# Patient Record
Sex: FEMALE | Race: WHITE | Employment: FULL TIME | URBAN - METROPOLITAN AREA
[De-identification: names, ages, dates, MRNs, and addresses within clinical notes are randomized per-mention and may not be internally consistent; named-entity substitution may affect disease eponyms.]

---

## 2018-05-02 ENCOUNTER — HOSPITAL ENCOUNTER (EMERGENCY)
Age: 28
Discharge: HOME | End: 2018-05-02
Payer: COMMERCIAL

## 2018-05-02 VITALS
RESPIRATION RATE: 14 BRPM | TEMPERATURE: 98.2 F | OXYGEN SATURATION: 97 % | WEIGHT: 145 LBS | DIASTOLIC BLOOD PRESSURE: 74 MMHG | SYSTOLIC BLOOD PRESSURE: 114 MMHG | HEART RATE: 86 BPM | BODY MASS INDEX: 25.69 KG/M2

## 2018-05-02 DIAGNOSIS — H10.9 CONJUNCTIVITIS OF BOTH EYES, UNSPECIFIED CONJUNCTIVITIS TYPE: Primary | ICD-10-CM

## 2018-05-02 PROCEDURE — 99212 OFFICE O/P EST SF 10 MIN: CPT

## 2018-05-02 RX ORDER — CIPROFLOXACIN HYDROCHLORIDE 3.5 MG/ML
SOLUTION/ DROPS TOPICAL
Qty: 1 BOTTLE | Refills: 0 | Status: SHIPPED | OUTPATIENT
Start: 2018-05-02

## 2018-05-02 RX ORDER — LORATADINE 10 MG/1
10 TABLET ORAL DAILY
Qty: 30 TABLET | Refills: 0 | Status: SHIPPED | OUTPATIENT
Start: 2018-05-02

## 2018-05-02 ASSESSMENT — PAIN DESCRIPTION - DESCRIPTORS: DESCRIPTORS: ACHING

## 2018-05-02 ASSESSMENT — PAIN SCALES - GENERAL: PAINLEVEL_OUTOF10: 4

## 2018-05-02 ASSESSMENT — PAIN DESCRIPTION - ORIENTATION: ORIENTATION: LEFT;RIGHT

## 2018-05-02 ASSESSMENT — PAIN DESCRIPTION - LOCATION: LOCATION: EYE

## 2018-05-02 ASSESSMENT — PAIN DESCRIPTION - PAIN TYPE: TYPE: ACUTE PAIN

## 2023-05-29 LAB
NIL(NEG) CONTROL SPOT COUNT: NORMAL
PANEL A SPOT COUNT: 1
PANEL B SPOT COUNT: 0
POS CONTROL SPOT COUNT: NORMAL
T-SPOT. TB INTERPRETATION: NEGATIVE

## 2023-06-23 LAB
BASOPHILS (10*3/UL) IN BLOOD BY AUTOMATED COUNT: 0.08 X10E9/L (ref 0–0.1)
BASOPHILS/100 LEUKOCYTES IN BLOOD BY AUTOMATED COUNT: 0.9 % (ref 0–2)
EOSINOPHILS (10*3/UL) IN BLOOD BY AUTOMATED COUNT: 0.6 X10E9/L (ref 0–0.7)
EOSINOPHILS/100 LEUKOCYTES IN BLOOD BY AUTOMATED COUNT: 6.7 % (ref 0–6)
ERYTHROCYTE DISTRIBUTION WIDTH (RATIO) BY AUTOMATED COUNT: 12.4 % (ref 11.5–14.5)
ERYTHROCYTE MEAN CORPUSCULAR HEMOGLOBIN CONCENTRATION (G/DL) BY AUTOMATED: 33.4 G/DL (ref 32–36)
ERYTHROCYTE MEAN CORPUSCULAR VOLUME (FL) BY AUTOMATED COUNT: 87 FL (ref 80–100)
ERYTHROCYTES (10*6/UL) IN BLOOD BY AUTOMATED COUNT: 5.08 X10E12/L (ref 4–5.2)
HEMATOCRIT (%) IN BLOOD BY AUTOMATED COUNT: 44.3 % (ref 36–46)
HEMOGLOBIN (G/DL) IN BLOOD: 14.8 G/DL (ref 12–16)
IMMATURE GRANULOCYTES/100 LEUKOCYTES IN BLOOD BY AUTOMATED COUNT: 0.2 % (ref 0–0.9)
LEUKOCYTES (10*3/UL) IN BLOOD BY AUTOMATED COUNT: 9 X10E9/L (ref 4.4–11.3)
LYMPHOCYTES (10*3/UL) IN BLOOD BY AUTOMATED COUNT: 3.9 X10E9/L (ref 1.2–4.8)
LYMPHOCYTES/100 LEUKOCYTES IN BLOOD BY AUTOMATED COUNT: 43.3 % (ref 13–44)
MONOCYTES (10*3/UL) IN BLOOD BY AUTOMATED COUNT: 0.55 X10E9/L (ref 0.1–1)
MONOCYTES/100 LEUKOCYTES IN BLOOD BY AUTOMATED COUNT: 6.1 % (ref 2–10)
NEUTROPHILS (10*3/UL) IN BLOOD BY AUTOMATED COUNT: 3.85 X10E9/L (ref 1.2–7.7)
NEUTROPHILS/100 LEUKOCYTES IN BLOOD BY AUTOMATED COUNT: 42.8 % (ref 40–80)
PLATELETS (10*3/UL) IN BLOOD AUTOMATED COUNT: 244 X10E9/L (ref 150–450)

## 2023-06-24 LAB
CHLAMYDIA TRACH., AMPLIFIED: NEGATIVE
N. GONORRHEA, AMPLIFIED: NEGATIVE
TRICHOMONAS VAGINALIS: NEGATIVE

## 2023-06-25 LAB
CLUE CELLS: NORMAL
NUGENT SCORE: 0
URINE CULTURE: NO GROWTH
YEAST: NORMAL

## 2023-08-28 PROBLEM — N39.3 STRESS INCONTINENCE OF URINE: Status: ACTIVE | Noted: 2023-08-28

## 2023-08-28 PROBLEM — K58.9 IRRITABLE BOWEL SYNDROME: Status: ACTIVE | Noted: 2023-08-28

## 2023-08-28 PROBLEM — N94.12 DEEP DYSPAREUNIA IN FEMALE: Status: ACTIVE | Noted: 2023-08-28

## 2023-08-28 PROBLEM — R10.32 ABDOMINAL PAIN, BILATERAL LOWER QUADRANT: Status: ACTIVE | Noted: 2023-08-28

## 2023-08-28 PROBLEM — R10.31 ABDOMINAL PAIN, BILATERAL LOWER QUADRANT: Status: ACTIVE | Noted: 2023-08-28

## 2023-08-28 PROBLEM — M62.838 MUSCLE SPASM: Status: ACTIVE | Noted: 2023-08-28

## 2023-08-28 PROBLEM — M79.673 FOOT PAIN: Status: ACTIVE | Noted: 2023-08-28

## 2023-08-28 PROBLEM — R15.9 STOOL INCONTINENCE: Status: ACTIVE | Noted: 2023-08-28

## 2023-08-28 PROBLEM — M62.89 PELVIC FLOOR DYSFUNCTION IN FEMALE: Status: ACTIVE | Noted: 2023-08-28

## 2023-08-28 PROBLEM — R10.9 ABDOMINAL PAIN OF MULTIPLE SITES: Status: ACTIVE | Noted: 2023-08-28

## 2023-08-28 PROBLEM — F33.9 MAJOR DEPRESSIVE DISORDER, RECURRENT (CMS-HCC): Status: ACTIVE | Noted: 2023-08-28

## 2023-08-28 PROBLEM — N89.8 VAGINAL DISCHARGE: Status: ACTIVE | Noted: 2023-08-28

## 2023-08-28 PROBLEM — R10.2 PELVIC PAIN IN FEMALE: Status: ACTIVE | Noted: 2023-08-28

## 2023-08-28 PROBLEM — F41.1 GENERALIZED ANXIETY DISORDER: Status: ACTIVE | Noted: 2023-08-28

## 2023-08-28 PROBLEM — N73.0 PID (ACUTE PELVIC INFLAMMATORY DISEASE): Status: ACTIVE | Noted: 2023-08-28

## 2023-08-28 PROBLEM — Z91.018 HISTORY OF FOOD ALLERGY: Status: ACTIVE | Noted: 2023-08-28

## 2023-08-28 PROBLEM — J32.9 CHRONIC SINUSITIS: Status: ACTIVE | Noted: 2023-08-28

## 2023-08-28 RX ORDER — PSYLLIUM HUSK 3.4 G/12G
POWDER ORAL
COMMUNITY
Start: 2022-08-04

## 2023-08-28 RX ORDER — BUPROPION HYDROCHLORIDE 150 MG/1
150 TABLET ORAL DAILY
COMMUNITY

## 2023-08-28 RX ORDER — PNV NO.95/FERROUS FUM/FOLIC AC 28MG-0.8MG
1 TABLET ORAL DAILY
COMMUNITY

## 2023-08-28 RX ORDER — MONTELUKAST SODIUM 4 MG/1
TABLET, CHEWABLE ORAL
COMMUNITY

## 2023-08-28 RX ORDER — CYANOCOBALAMIN (VITAMIN B-12) 500 MCG
1 TABLET ORAL DAILY
COMMUNITY

## 2023-08-28 RX ORDER — CHOLECALCIFEROL (VITAMIN D3) 25 MCG
1 TABLET ORAL DAILY
COMMUNITY

## 2023-08-28 RX ORDER — LEVONORGESTREL 52 MG/1
INTRAUTERINE DEVICE INTRAUTERINE
COMMUNITY

## 2023-08-28 RX ORDER — DIAZEPAM 2 MG/1
2 TABLET ORAL
COMMUNITY

## 2023-10-05 ENCOUNTER — APPOINTMENT (OUTPATIENT)
Dept: PHYSICAL THERAPY | Facility: CLINIC | Age: 33
End: 2023-10-05
Payer: COMMERCIAL

## 2023-10-06 ENCOUNTER — TELEMEDICINE (OUTPATIENT)
Dept: BEHAVIORAL HEALTH | Facility: CLINIC | Age: 33
End: 2023-10-06
Payer: COMMERCIAL

## 2023-10-06 DIAGNOSIS — F41.1 GENERALIZED ANXIETY DISORDER: ICD-10-CM

## 2023-10-06 DIAGNOSIS — F33.0 MILD EPISODE OF RECURRENT MAJOR DEPRESSIVE DISORDER (CMS-HCC): ICD-10-CM

## 2023-10-06 PROCEDURE — 90837 PSYTX W PT 60 MINUTES: CPT | Performed by: PSYCHOLOGIST

## 2023-10-06 NOTE — PROGRESS NOTES
Subjective   Patient ID: Ellie Garcia is a 33 y.o. female who presents for ELEANOR, depression, and distress associated with pelvic pain and condition.    Virtual or Telephone Consent    An interactive audio and video telecommunication system which permits real time communications between the patient (at the originating site) and provider (at the distant site) was utilized to provide this telehealth service.   Verbal consent was requested and obtained from Ellie Garcia on this date, 10/06/23 for a telehealth visit.        This is a virtual video visit due to the COVID-19 Pandemic.   Both the patient, and family and caregivers and guardians as appropriate, were informed of the current need to conduct treatment via virtual video visit. I have confirmed the patient’s identity via the following (minimum of three) acceptable identifiers as per  Policy PH-9: name, birthdate, street address, and SSN.     This was a followup appointment between provider and patient to address symptoms of postpartum anxiety, depression, and ptsd.     Ellie reported some distress in context of interpersonal relationship and overall stress level. She described appropriate use of assertive communication and distress tolerance skills., and radical acceptance and cognitive restructuring skills. Provider gave positive feedback about this skill use, and reviewed psychoeducation on assertive communication and common patterns of unhealthy communication/boundaries. Provider gave further psychoeducation on mindfulness and role of mindfulness in accepting emotions and in sexual function/response and encouraged consideration of resource book Better Sex through Mindfulness by Windy Hamm and associated mindfulness exercises. Provider gave supportive counseling and normalized common emotional experiences.      PSYCHOSOCIAL: True Argueta has a daughter (Rose Marie). She is  to her , Baltazar. 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   GOALS: Ellie reported, “I want to find new things that are not physical coping. I need new ways to cope and new strategies.”  PLAN: [place] plan made for individual CBT bimonthly to address mood and anxiety and ptsd symptoms and anxiety in response to medical/pelvic conditions.    Objective   Social History     Substance and Sexual Activity   Alcohol Use Not on file      Social History     Social History Narrative    Not on file        Assessment/Plan   Problem List Items Addressed This Visit          Mental Health    Generalized anxiety disorder    Major depressive disorder, recurrent (CMS/HCC)

## 2023-10-06 NOTE — PATIENT INSTRUCTIONS
read handouts provided on anxiety and trauma. keep list of anxiety triggers/avoidance behaviors. read handout on ACT. followup in 2 weeks. practice cognitive defusion as discussed. practice 'cognitive restructuring' skill (with challenging questions worksheet, cognitive distortions worksheet as discussed).

## 2023-10-19 ENCOUNTER — OFFICE VISIT (OUTPATIENT)
Dept: UROLOGY | Facility: CLINIC | Age: 33
End: 2023-10-19
Payer: COMMERCIAL

## 2023-10-19 DIAGNOSIS — N32.81 OAB (OVERACTIVE BLADDER): ICD-10-CM

## 2023-10-19 DIAGNOSIS — R10.2 PELVIC PAIN: Primary | ICD-10-CM

## 2023-10-19 DIAGNOSIS — N39.3 SUI (STRESS URINARY INCONTINENCE, FEMALE): ICD-10-CM

## 2023-10-19 DIAGNOSIS — R15.9 INCONTINENCE OF FECES, UNSPECIFIED FECAL INCONTINENCE TYPE: ICD-10-CM

## 2023-10-19 PROCEDURE — 99214 OFFICE O/P EST MOD 30 MIN: CPT | Performed by: STUDENT IN AN ORGANIZED HEALTH CARE EDUCATION/TRAINING PROGRAM

## 2023-10-19 NOTE — PROGRESS NOTES
"         HISTORY OF PRESENT ILLNESS:   33 y.o. presenting in office for follow up. She is still struggling with stress incontinence. She is not planning to have any more children. She is open to starting urethral bulking.    Was seen in ER, had CT that showed \"cervical thickening\" I reviewed the images and they appear WNL         Past Medical History  She has a past medical history of Encounter for  screening, unspecified (2021), Encounter for removal of intrauterine contraceptive device (2019), Encounter for supervision of normal first pregnancy, first trimester (2020), Encounter for supervision of normal first pregnancy, second trimester (2020), Encounter for supervision of normal first pregnancy, third trimester (2020), Encounter for supervision of other normal pregnancy, first trimester (2021), Encounter for supervision of other normal pregnancy, third trimester (2021), Maternal care for other known or suspected poor fetal growth, unspecified trimester, fetus 1 (2021), Missed  (2020), Personal history of other mental and behavioral disorders, Personal history of other mental and behavioral disorders, Pregnancy with inconclusive fetal viability, not applicable or unspecified (2020), Supervision of pregnancy with other poor reproductive or obstetric history, first trimester (2021), Supervision of pregnancy with other poor reproductive or obstetric history, second trimester (06/10/2021), Supervision of pregnancy with other poor reproductive or obstetric history, third trimester (2021), Supervision of pregnancy with other poor reproductive or obstetric history, unspecified trimester (2021), and Threatened  (02/15/2021).    Surgical History  She has a past surgical history that includes Other surgical history (2014) and Other surgical history (2020).     Social History  She has no history on file for " "tobacco use, alcohol use, and drug use.    Family History  Family History   Problem Relation Name Age of Onset    Other (CVA) Father      Diabetes type II Father's Sister      Diabetes type II Paternal Grandfather      Crohn's disease Other          Allergies  Codeine and Shellfish containing products      A comprehensive 10+ review of systems was negative except for: see hpi                          PHYSICAL EXAMINATION:  BP Readings from Last 3 Encounters:   07/06/23 118/82   06/23/23 110/80   05/16/22 111/74      Wt Readings from Last 3 Encounters:   07/06/23 63.5 kg (140 lb)   06/23/23 64.9 kg (143 lb)   05/16/22 65.8 kg (145 lb)      BMI: Estimated body mass index is 24.41 kg/m² as calculated from the following:    Height as of 6/23/23: 1.613 m (5' 3.5\").    Weight as of 7/6/23: 63.5 kg (140 lb).  BSA: Estimated body surface area is 1.69 meters squared as calculated from the following:    Height as of 6/23/23: 1.613 m (5' 3.5\").    Weight as of 7/6/23: 63.5 kg (140 lb).  HEENT: Normocephalic, atraumatic, PER EOMI, nonicteric, trachea normal, thyroid normal, oropharynx normal.  CARDIAC: regular rate & rhythm, S1 & S2 normal.  No heaves, thrills, gallops or murmurs.  LUNGS: Clear to auscultation, no spinal or CV tenderness.  EXTREMITIES: No evidence of cyanosis, clubbing or edema.               Assessment:    33-year-old with fecal incontinence OASIS status post anal sphincteroplasty in April and with stress incontinence     Uterovaginal Prolapse Stage 2:  most bothered by pressure  S/p PFT, sx improved greatly      FI:  Has improved dramatically after sphincteroplasty with Dr. Lockwood, with residual FI  marked improvement with colestipol 1 mg as needed  can use immodium or lomotil if eating foods that might cause FI   not planning on more kids at this time      Stress incontinence:  Moderately bothersome  No improvement with PT  Wants to consider bulking  F/up in 3-4 months             Scribe Attestation  By " signing my name below, I, Robina Ugarte , Scribantonio   attest that this documentation has been prepared under the direction and in the presence of Guerrero Gabriel MD.

## 2023-10-27 ENCOUNTER — TELEMEDICINE (OUTPATIENT)
Dept: BEHAVIORAL HEALTH | Facility: CLINIC | Age: 33
End: 2023-10-27
Payer: COMMERCIAL

## 2023-10-27 DIAGNOSIS — F33.1 MODERATE EPISODE OF RECURRENT MAJOR DEPRESSIVE DISORDER (MULTI): Primary | ICD-10-CM

## 2023-10-27 DIAGNOSIS — F41.1 GENERALIZED ANXIETY DISORDER: ICD-10-CM

## 2023-10-27 PROCEDURE — 90837 PSYTX W PT 60 MINUTES: CPT | Performed by: PSYCHOLOGIST

## 2023-10-27 NOTE — PROGRESS NOTES
START TIME: 8:05a  END TIME: 9:00 a  TOTAL TIME FACE TO FACE: 55 minutes    Subjective   Patient ID: Ellei Garcia is a 33 y.o. female who presents for   Problem List Items Addressed This Visit       Generalized anxiety disorder    Major depressive disorder, recurrent (CMS/HCC) - Primary   .    Virtual or Telephone Consent    An interactive audio and video telecommunication system which permits real time communications between the patient (at the originating site) and provider (at the distant site) was utilized to provide this telehealth service.   Verbal consent was requested and obtained from Ellie Garcia on this date, 10/27/23 for a telehealth visit.      CONTENT OF SESSION:   This was a followup appointment between provider and patient to address symptoms of postpartum anxiety, depression, and ptsd.      Ellie reported feeling “exhausted” lately in context of parenting/work responsibilities. They are working on potty training Rose Marie. She noted some internalized negative self-judgment and 'mind reading' thoughts in context of relationships. She also noted distress related to social support in parenting/motherhood. Time was spent on problem solving strategies to increase social support (e.g., asking for contact information; following up to invite others). Provider also used Socratic questioning to facilitate cognitive restructuring of potential negative self-judgment thoughts. Provider gave supportive counseling and normalized common emotional experiences.           Objective   Social History     Substance and Sexual Activity   Alcohol Use Not on file      Social History     Social History Narrative    Not on file        Assessment/Plan   Problem List Items Addressed This Visit       Generalized anxiety disorder    Major depressive disorder, recurrent (CMS/HCC) - Primary     PLAN: [place] plan made for individual CBT bimonthly to address mood and anxiety and ptsd symptoms and anxiety in response to  medical/pelvic conditions.  PRACTICE EXERCISES PROVIDED:read handouts provided on anxiety and trauma. keep list of anxiety triggers/avoidance behaviors. read handout on ACT. followup in 2 weeks. practice cognitive defusion as discussed. practice 'cognitive restructuring' skill (with challenging questions worksheet, cognitive distortions worksheet as discussed).

## 2023-11-03 ENCOUNTER — TELEMEDICINE (OUTPATIENT)
Dept: BEHAVIORAL HEALTH | Facility: CLINIC | Age: 33
End: 2023-11-03
Payer: COMMERCIAL

## 2023-11-03 DIAGNOSIS — M62.89 PELVIC FLOOR DYSFUNCTION IN FEMALE: ICD-10-CM

## 2023-11-03 DIAGNOSIS — F41.1 GENERALIZED ANXIETY DISORDER: Primary | ICD-10-CM

## 2023-11-03 DIAGNOSIS — F33.1 MODERATE EPISODE OF RECURRENT MAJOR DEPRESSIVE DISORDER (MULTI): ICD-10-CM

## 2023-11-03 PROCEDURE — 90837 PSYTX W PT 60 MINUTES: CPT | Performed by: PSYCHOLOGIST

## 2023-11-03 NOTE — PROGRESS NOTES
START TIME: 9:05  END TIME: 10a  TOTAL TIME FACE TO FACE: 55mins    Subjective   Patient ID: Ellie Garcia is a 33 y.o. female who presents for   Problem List Items Addressed This Visit       Generalized anxiety disorder - Primary    Major depressive disorder, recurrent (CMS/HCC)    Pelvic floor dysfunction in female   .    Virtual or Telephone Consent    An interactive audio and video telecommunication system which permits real time communications between the patient (at the originating site) and provider (at the distant site) was utilized to provide this telehealth service.   Verbal consent was requested and obtained from Ellie Garcia on this date, 11/03/23 for a telehealth visit.      CONTENT OF SESSION:   This was a followup appointment between provider and patient to address symptoms of postpartum anxiety, depression, and ptsd.      Ellie reported primary distress and anxiety in context of interpersonal stressor. She described appropriate use of boundary setting and assertive communication and provider gave positive feedback about this skill use. Ellie identified some of her common cognitive patterns contributing to anxiety, and at times excessive guilt. provider used Socratic questioning to facilitate cognitive restructuring. time was also spent on role play of assertive communication. provider gave psychoeducation on 'validate the valid' skill from DBT skills, and reviewed psychoeducation on cognitive defusion skill for anxiety and irritability management. Provider gave supportive counseling and normalized common emotional experiences.   Objective   Social History     Substance and Sexual Activity   Alcohol Use Not on file      Social History     Social History Narrative    Not on file        Assessment/Plan   Problem List Items Addressed This Visit       Generalized anxiety disorder - Primary    Major depressive disorder, recurrent (CMS/HCC)    Pelvic floor dysfunction in female     PLAN:  [place] plan made for individual CBT bimonthly to address mood and anxiety and ptsd symptoms and anxiety in response to medical/pelvic conditions.     PRACTICE EXERCISES PROVIDED: read handouts provided on anxiety and trauma. keep list of anxiety triggers/avoidance behaviors. read handout on ACT. followup in 2 weeks. practice cognitive defusion as discussed. practice 'cognitive restructuring' skill (with challenging questions worksheet, cognitive distortions worksheet as discussed).

## 2023-11-17 ENCOUNTER — TELEMEDICINE (OUTPATIENT)
Dept: BEHAVIORAL HEALTH | Facility: CLINIC | Age: 33
End: 2023-11-17
Payer: COMMERCIAL

## 2023-11-17 DIAGNOSIS — F41.1 GENERALIZED ANXIETY DISORDER: Primary | ICD-10-CM

## 2023-11-17 DIAGNOSIS — F33.1 MODERATE EPISODE OF RECURRENT MAJOR DEPRESSIVE DISORDER (MULTI): ICD-10-CM

## 2023-11-17 PROCEDURE — 90837 PSYTX W PT 60 MINUTES: CPT | Performed by: PSYCHOLOGIST

## 2023-11-17 NOTE — PROGRESS NOTES
START TIME: 8:05  END TIME: 9a  TOTAL TIME FACE TO FACE: 55min    Subjective   Patient ID: Ellie Garcia is a 33 y.o. female who presents for   Problem List Items Addressed This Visit       Generalized anxiety disorder - Primary    Major depressive disorder, recurrent (CMS/HCC)   .    Virtual or Telephone Consent    An interactive audio and video telecommunication system which permits real time communications between the patient (at the originating site) and provider (at the distant site) was utilized to provide this telehealth service.   Verbal consent was requested and obtained from Ellie Garcia on this date, 11/17/23 for a telehealth visit.      CONTENT OF SESSION:  This was a followup appointment between provider and patient to address symptoms of postpartum anxiety, depression, and ptsd.         Ellie reported distress related to feeling exhausted /drained interpersonally. Time was spent on problem solving around potential ways to implement more self care and interpersonal boundaries. Ellie also noted her own cognitive and behavioral patterns in relationships and at times avoidance behaviors. Provider gave further psychoeducation on common psychological adjustment reactions to parenthood and on healthy relationship boundaries. Time was also spent on role play of assertive communication. Provider gave supportive counseling and normalized common emotional experiences.      Objective   Social History     Substance and Sexual Activity   Alcohol Use Not on file      Social History     Social History Narrative    Not on file        Assessment/Plan   Problem List Items Addressed This Visit       Generalized anxiety disorder - Primary    Major depressive disorder, recurrent (CMS/HCC)     PLAN: [place] plan made for individual CBT bimonthly to address mood and anxiety and ptsd symptoms and anxiety in response to medical/pelvic conditions.    PRACTICE EXERCISES PROVIDED: read handouts provided on anxiety  and trauma. keep list of anxiety triggers/avoidance behaviors. read handout on ACT. followup in 2 weeks. practice cognitive defusion as discussed. practice 'cognitive restructuring' skill (with challenging questions worksheet, cognitive distortions worksheet as discussed).

## 2024-01-09 ENCOUNTER — DOCUMENTATION (OUTPATIENT)
Dept: PHYSICAL THERAPY | Facility: CLINIC | Age: 34
End: 2024-01-09
Payer: COMMERCIAL

## 2024-01-09 NOTE — PROGRESS NOTES
Physical Therapy    Discharge Summary    Name: Ellie Garcia  MRN: [id number]  : 1990  Date: 24    Discharge Summary: PT    Discharge Information: Date of discharge 24, Date of last visit 23, Date of evaluation 6-15-23, Number of attended visits 12, Referred by Dr. Gabriel, and Referred for muscle spasm/PFPT    Discharge Status: pt was progressing well with dilators and self management. There has been no contact since last appointment.     Rehab Discharge Reason: Achieved all and/or the most significant goals(s)

## 2024-01-12 ENCOUNTER — TELEMEDICINE (OUTPATIENT)
Dept: BEHAVIORAL HEALTH | Facility: CLINIC | Age: 34
End: 2024-01-12
Payer: COMMERCIAL

## 2024-01-12 DIAGNOSIS — F41.1 GENERALIZED ANXIETY DISORDER: Primary | ICD-10-CM

## 2024-01-12 DIAGNOSIS — F33.1 MODERATE EPISODE OF RECURRENT MAJOR DEPRESSIVE DISORDER (MULTI): ICD-10-CM

## 2024-01-12 PROCEDURE — 90837 PSYTX W PT 60 MINUTES: CPT | Performed by: PSYCHOLOGIST

## 2024-01-12 NOTE — PROGRESS NOTES
"START TIME: 8:05  END TIME: 9  TOTAL TIME FACE TO FACE: 55mins    Subjective   Patient ID: Ellie Garcia is a 33 y.o. female who presents for   Problem List Items Addressed This Visit       Generalized anxiety disorder - Primary    Major depressive disorder, recurrent (CMS/HCC)   .    Virtual or Telephone Consent    An interactive audio and video telecommunication system which permits real time communications between the patient (at the originating site) and provider (at the distant site) was utilized to provide this telehealth service.   Verbal consent was requested and obtained from Ellie Garcia on this date, 01/12/24 for a telehealth visit.      CONTENT OF SESSION:  This was a followup appointment between provider and patient to address symptoms of postpartum anxiety, depression, and ptsd.        Focus of session was on Ellie's report of increased generalized and social anxiety, that she attributed in part to busy holiday seasonal stress. Provider used Socratic questioning to facilitate cognitive restructuring of unrealistic or unhelpful thoughts. Ellie identified some of her core beliefs contributing to anxiety and engaged in cognitive restructuring. Provider gave further psychoeducation on cognitive restructuring and cognitive defusion, and introduced the \"why why why\" exercise from ACT as a form of further cognitive challenging. Provider gave supportive counseling and normalized common emotional experiences.      Objective   Social History     Substance and Sexual Activity   Alcohol Use Not on file      Social History     Social History Narrative    Not on file        Assessment/Plan   Problem List Items Addressed This Visit       Generalized anxiety disorder - Primary    Major depressive disorder, recurrent (CMS/HCC)     PLAN: [place] plan made for individual CBT bimonthly to address mood and anxiety and ptsd symptoms and anxiety in response to medical/pelvic conditions.    PRACTICE EXERCISES " PROVIDED: read handouts provided on anxiety and trauma. keep list of anxiety triggers/avoidance behaviors. read handout on ACT. followup in 2 weeks. practice cognitive defusion as discussed. practice 'cognitive restructuring' skill (with challenging questions worksheet, cognitive distortions worksheet as discussed).

## 2024-01-25 ENCOUNTER — TELEMEDICINE (OUTPATIENT)
Dept: BEHAVIORAL HEALTH | Facility: CLINIC | Age: 34
End: 2024-01-25
Payer: COMMERCIAL

## 2024-01-25 DIAGNOSIS — F33.1 MODERATE EPISODE OF RECURRENT MAJOR DEPRESSIVE DISORDER (MULTI): ICD-10-CM

## 2024-01-25 DIAGNOSIS — F41.1 GENERALIZED ANXIETY DISORDER: Primary | ICD-10-CM

## 2024-01-25 PROCEDURE — 90837 PSYTX W PT 60 MINUTES: CPT | Performed by: PSYCHOLOGIST

## 2024-01-25 NOTE — PROGRESS NOTES
START TIME: 8:05  END TIME: 9  TOTAL TIME FACE TO FACE: 55min    Subjective   Patient ID: Ellie Garcia is a 33 y.o. female who presents for   Problem List Items Addressed This Visit       Generalized anxiety disorder - Primary    Major depressive disorder, recurrent (CMS/HCC)   .    Virtual or Telephone Consent    An interactive audio and video telecommunication system which permits real time communications between the patient (at the originating site) and provider (at the distant site) was utilized to provide this telehealth service.   Verbal consent was requested and obtained from Ellie Garcia on this date, 01/25/24 for a telehealth visit.      CONTENT OF SESSION: This was a followup appointment between provider and patient to address symptoms of postpartum anxiety, depression, and ptsd.      Ellie reported acute stress response to recent experience of health event for her daughter, and taking her to the ER. She noted some hypervigilance and intrusive worry thoughts. Provider gave psychoeducation on on psychological reactions to traumatic events. Provider encouraged focus on skills of getting adequate sleep and minimizing anxious avoidance behaviors as skills to reduce risk for PTSD. Ellie described appropriate coping strategies in response to this event and provider gave positive feedback about this skill use (e.g., cognitive restructuring). Provider gave supportive counseling and normalized common emotional experiences.         Objective   Social History     Substance and Sexual Activity   Alcohol Use Not on file      Social History     Social History Narrative    Not on file        Assessment/Plan   Problem List Items Addressed This Visit       Generalized anxiety disorder - Primary    Major depressive disorder, recurrent (CMS/HCC)     PLAN: [place] plan made for individual CBT bimonthly to address mood and anxiety and ptsd symptoms and anxiety in response to medical/pelvic conditions.     PRACTICE EXERCISES PROVIDED: read handouts provided on anxiety and trauma. keep list of anxiety triggers/avoidance behaviors. read handout on ACT. followup in 2 weeks. practice cognitive defusion as discussed. practice 'cognitive restructuring' skill (with challenging questions worksheet, cognitive distortions worksheet as discussed).

## 2024-02-09 ENCOUNTER — TELEMEDICINE (OUTPATIENT)
Dept: BEHAVIORAL HEALTH | Facility: CLINIC | Age: 34
End: 2024-02-09
Payer: COMMERCIAL

## 2024-02-09 DIAGNOSIS — F33.1 MODERATE EPISODE OF RECURRENT MAJOR DEPRESSIVE DISORDER (MULTI): ICD-10-CM

## 2024-02-09 DIAGNOSIS — F41.1 GENERALIZED ANXIETY DISORDER: Primary | ICD-10-CM

## 2024-02-09 PROCEDURE — 90837 PSYTX W PT 60 MINUTES: CPT | Performed by: PSYCHOLOGIST

## 2024-02-23 ENCOUNTER — TELEMEDICINE (OUTPATIENT)
Dept: BEHAVIORAL HEALTH | Facility: CLINIC | Age: 34
End: 2024-02-23
Payer: COMMERCIAL

## 2024-02-23 DIAGNOSIS — F33.1 MODERATE EPISODE OF RECURRENT MAJOR DEPRESSIVE DISORDER (MULTI): Primary | ICD-10-CM

## 2024-02-23 DIAGNOSIS — F41.1 GENERALIZED ANXIETY DISORDER: ICD-10-CM

## 2024-02-23 PROCEDURE — 90837 PSYTX W PT 60 MINUTES: CPT | Performed by: PSYCHOLOGIST

## 2024-02-23 NOTE — PROGRESS NOTES
START TIME: 8:05a  END TIME: 9a  TOTAL TIME FACE TO FACE: 55min    Subjective   Patient ID: Ellie Garcia is a 33 y.o. female who presents for   Problem List Items Addressed This Visit       Generalized anxiety disorder    Major depressive disorder, recurrent (CMS/HCC) - Primary   .    Virtual or Telephone Consent    An interactive audio and video telecommunication system which permits real time communications between the patient (at the originating site) and provider (at the distant site) was utilized to provide this telehealth service.   Verbal consent was requested and obtained from Ellie Garcia on this date, 02/23/24 for a telehealth visit.      CONTENT OF SESSION: This was a followup appointment between provider and patient to address symptoms of postpartum anxiety, depression, and ptsd.     Ellie reported feeling exhausted in response to stressors/responsibilities, and some episodes of depression. She acknowledged some negative self judgment thoughts but described her use of appropriate boundary and limit setting, values guided action, and assertive communication. Provider gave positive feedback about this skill use. Provider used Socratic questioning to facilitate cognitive restructuring of potential unrealistic or unhelpful cognitions and patient was able to engage in cognitive restructuring. provider also reviewed psychoeducation on cognitive restructuring skills in response to negative self-judgment thoughts. Provider gave supportive counseling and normalized common emotional experiences.      Objective   Social History     Substance and Sexual Activity   Alcohol Use Not on file      Social History     Social History Narrative    Not on file        Assessment/Plan   Problem List Items Addressed This Visit       Generalized anxiety disorder    Major depressive disorder, recurrent (CMS/HCC) - Primary   PLAN: [place] plan made for individual CBT bimonthly to address mood and anxiety and ptsd  symptoms and anxiety in response to medical/pelvic conditions.    PRACTICE EXERCISES PROVIDED: read handouts provided on anxiety and trauma. keep list of anxiety triggers/avoidance behaviors. read handout on ACT. followup in 2 weeks. practice cognitive defusion as discussed. practice 'cognitive restructuring' skill (with challenging questions worksheet, cognitive distortions worksheet as discussed).

## 2024-03-08 ENCOUNTER — TELEMEDICINE (OUTPATIENT)
Dept: BEHAVIORAL HEALTH | Facility: CLINIC | Age: 34
End: 2024-03-08
Payer: COMMERCIAL

## 2024-03-08 DIAGNOSIS — F41.1 GENERALIZED ANXIETY DISORDER: ICD-10-CM

## 2024-03-08 DIAGNOSIS — F33.1 MODERATE EPISODE OF RECURRENT MAJOR DEPRESSIVE DISORDER (MULTI): Primary | ICD-10-CM

## 2024-03-08 PROCEDURE — 90837 PSYTX W PT 60 MINUTES: CPT | Performed by: PSYCHOLOGIST

## 2024-03-08 NOTE — PROGRESS NOTES
START TIME: 9:05  END TIME: 10  TOTAL TIME FACE TO FACE: 55mins    Subjective   Patient ID: Ellie Garcia is a 33 y.o. female who presents for   Problem List Items Addressed This Visit       Generalized anxiety disorder    Major depressive disorder, recurrent (CMS/HCC) - Primary   .    Virtual or Telephone Consent    An interactive audio and video telecommunication system which permits real time communications between the patient (at the originating site) and provider (at the distant site) was utilized to provide this telehealth service.   Verbal consent was requested and obtained from Ellie Garcia on this date, 03/08/24 for a telehealth visit.      CONTENT OF SESSION: This was a followup appointment between provider and patient to address symptoms of postpartum anxiety, depression, and ptsd.     Ellie reported general improvement in depression. She described her use of appropriate cognitive restructuring and assertive communication skills, as well as values-guided action. She described also increased awareness of possible anxious avoidant behaviors. Provider gave positive feedback about this skill use. Provider reviewed psychoeducation on assertive communication and boundaries in context of close relationships (consistent with [facility] [place] DBT model). Provider gave supportive counseling and normalized common emotional experiences.         Objective   Social History     Substance and Sexual Activity   Alcohol Use Not on file      Social History     Social History Narrative    Not on file        Assessment/Plan   Problem List Items Addressed This Visit       Generalized anxiety disorder    Major depressive disorder, recurrent (CMS/HCC) - Primary   PLAN: [place] plan made for individual CBT bimonthly to address mood and anxiety and ptsd symptoms and anxiety in response to medical/pelvic conditions.    PRACTICE EXERCISES PROVIDED: read handouts provided on anxiety and trauma. keep list of anxiety  triggers/avoidance behaviors. read handout on ACT. followup in 2 weeks. practice cognitive defusion as discussed. practice 'cognitive restructuring' skill (with challenging questions worksheet, cognitive distortions worksheet as discussed).

## 2024-03-11 NOTE — PROGRESS NOTES
START TIME: 8:05a  END TIME: 9a  TOTAL TIME FACE TO FACE: 55mins    Subjective   Patient ID: Ellie Garcia is a 33 y.o. female who presents for   Problem List Items Addressed This Visit       Generalized anxiety disorder - Primary    Major depressive disorder, recurrent (CMS/HCC)   .    Virtual or Telephone Consent    An interactive audio and video telecommunication system which permits real time communications between the patient (at the originating site) and provider (at the distant site) was utilized to provide this telehealth service.   Verbal consent was requested and obtained from Ellie Garcia on this date, 02/09/24 for a telehealth visit.      CONTENT OF SESSION: This was a followup appointment between provider and patient to address symptoms of postpartum anxiety, depression, and ptsd.     Ellie reported distress in context of relationships and identified her experience of common cognitive and behavioral patterns. She described appropriate use of assertive communication skills and provider gave positive feedback about this skill use. Provider gave psychoeducation on anxiety. Provider used Socratic questioning to facilitate cognitive restructuring of potential unrealistic or unhelpful cognitions and patient was able to engage in cognitive restructuring. Provider gave supportive counseling and normalized common emotional experiences.      Objective   Social History     Substance and Sexual Activity   Alcohol Use Not on file      Social History     Social History Narrative    Not on file        Assessment/Plan   Problem List Items Addressed This Visit       Generalized anxiety disorder - Primary    Major depressive disorder, recurrent (CMS/HCC)     PLAN: [place] plan made for individual CBT bimonthly to address mood and anxiety and ptsd symptoms and anxiety in response to medical/pelvic conditions.    PRACTICE EXERCISES PROVIDED: read handouts provided on anxiety and trauma. keep list of anxiety  Subjective:       Patient ID: Dot Doyle is a 47 y.o. female.    Chief Complaint: Cough    HPI    47-year-old female here for evaluation of a cough.  This has been going on since Saturday.  She had a similar respiratory illness 3 weeks ago.  She has body aches, headache, feels like her joints hurt.  She has a temp to 100.  She has not felt feverish today just Saturday/Sunday.  She is achy and lethargic. She feels like she has a thick layer of mucus in her throat.  She is home with her kids 13, 11, 10.  Her children had a similar respiratory illness and tested negative for COVID, flu, strep.  Her son went to the doctor Friday and was negative for these as well.    She has a weird issue under her arm. It looks like s bug bite the last 5 days.      Review of Systems      Objective:      Physical Exam  Vitals reviewed.   Constitutional:       Appearance: She is well-developed.   HENT:      Head: Normocephalic and atraumatic.      Mouth/Throat:      Pharynx: No oropharyngeal exudate.   Eyes:      General: No scleral icterus.        Right eye: No discharge.         Left eye: No discharge.      Pupils: Pupils are equal, round, and reactive to light.   Neck:      Thyroid: No thyromegaly.      Trachea: No tracheal deviation.   Cardiovascular:      Rate and Rhythm: Normal rate and regular rhythm.      Heart sounds: Normal heart sounds. No murmur heard.     No friction rub. No gallop.   Pulmonary:      Effort: Pulmonary effort is normal. No respiratory distress.      Breath sounds: Normal breath sounds. No wheezing or rales.   Chest:      Chest wall: No tenderness.   Abdominal:      General: Bowel sounds are normal. There is no distension.      Palpations: Abdomen is soft. There is no mass.      Tenderness: There is no abdominal tenderness. There is no guarding or rebound.   Musculoskeletal:         General: No tenderness. Normal range of motion.      Cervical back: Normal range of motion and neck supple.   Skin:      General: Skin is warm and dry.      Coloration: Skin is not pale.      Findings: No erythema or rash.   Neurological:      Mental Status: She is alert and oriented to person, place, and time.   Psychiatric:         Behavior: Behavior normal.         Assessment:       1. Cough, unspecified type  - POCT Influenza A/B Rapid Antigen  - COVID-19 Routine Screening    2. Fever, unspecified fever cause  - POCT Influenza A/B Rapid Antigen  - COVID-19 Routine Screening      Plan:       1/2.  Check rapid flu, COVID PCR.  Recommend regular Tylenol and Advil to help with fever if patient can not tolerate fever.  If she can, let fever run its course.             triggers/avoidance behaviors. read handout on ACT. followup in 2 weeks. practice cognitive defusion as discussed. practice 'cognitive restructuring' skill (with challenging questions worksheet, cognitive distortions worksheet as discussed).

## 2024-03-22 ENCOUNTER — TELEMEDICINE (OUTPATIENT)
Dept: BEHAVIORAL HEALTH | Facility: CLINIC | Age: 34
End: 2024-03-22
Payer: COMMERCIAL

## 2024-03-22 DIAGNOSIS — F33.1 MODERATE EPISODE OF RECURRENT MAJOR DEPRESSIVE DISORDER (MULTI): Primary | ICD-10-CM

## 2024-03-22 DIAGNOSIS — F41.1 GENERALIZED ANXIETY DISORDER: ICD-10-CM

## 2024-03-22 PROCEDURE — 90837 PSYTX W PT 60 MINUTES: CPT | Performed by: PSYCHOLOGIST

## 2024-03-22 NOTE — PROGRESS NOTES
START TIME: 8  END TIME:9  TOTAL TIME FACE TO FACE: 55min    Subjective   Patient ID: Ellie Garcia is a 33 y.o. female who presents for   Problem List Items Addressed This Visit    None  .    Virtual or Telephone Consent    An interactive audio and video telecommunication system which permits real time communications between the patient (at the originating site) and provider (at the distant site) was utilized to provide this telehealth service.   Verbal consent was requested and obtained from Ellie Garcia on this date, 03/22/24 for a telehealth visit.      CONTENT OF SESSION: This was a followup appointment between provider and patient to address symptoms of postpartum anxiety, depression, and ptsd.     Ellie reported feeling more fatigued lately, despite getting adequate sleep. Time was spent discussing her activity pacing and on identification of factors potentially contributing to fatigue. Provider reviewed psychoeducation on physical aspects of anxiety and on role of relaxation and mindfulness in anxiety reduction. Ellie identified her behavioral goals for gradual increase in physical movement and moments of mindfulness/not multitasking.  Provider gave supportive counseling and normalized common emotional experiences.      Objective   Social History     Substance and Sexual Activity   Alcohol Use Not on file      Social History     Social History Narrative    Not on file        Assessment/Plan   Problem List Items Addressed This Visit    None    PLAN: [place] plan made for individual CBT bimonthly to address mood and anxiety and ptsd symptoms and anxiety in response to medical/pelvic conditions.    PRACTICE EXERCISES PROVIDED: read handouts provided on anxiety and trauma. keep list of anxiety triggers/avoidance behaviors. read handout on ACT. followup in 2 weeks. practice cognitive defusion as discussed. practice 'cognitive restructuring' skill (with challenging questions worksheet, cognitive  distortions worksheet as discussed).

## 2024-04-26 ENCOUNTER — TELEMEDICINE (OUTPATIENT)
Dept: BEHAVIORAL HEALTH | Facility: CLINIC | Age: 34
End: 2024-04-26
Payer: COMMERCIAL

## 2024-04-26 DIAGNOSIS — F41.1 GENERALIZED ANXIETY DISORDER: ICD-10-CM

## 2024-04-26 DIAGNOSIS — F33.1 MODERATE EPISODE OF RECURRENT MAJOR DEPRESSIVE DISORDER (MULTI): Primary | ICD-10-CM

## 2024-04-26 PROCEDURE — 90837 PSYTX W PT 60 MINUTES: CPT | Performed by: PSYCHOLOGIST

## 2024-04-26 NOTE — PROGRESS NOTES
"START TIME: 8:05  END TIME: 9  TOTAL TIME FACE TO FACE: 55mins    Subjective   Patient ID: Ellie Garcia is a 33 y.o. female who presents for   Problem List Items Addressed This Visit       Generalized anxiety disorder    Major depressive disorder, recurrent (CMS-HCC) - Primary   .    Virtual or Telephone Consent    An interactive audio and video telecommunication system which permits real time communications between the patient (at the originating site) and provider (at the distant site) was utilized to provide this telehealth service.   Verbal consent was requested and obtained from Ellie Garcia on this date, 04/26/24 for a telehealth visit.      CONTENT OF SESSION: This was a followup appointment between provider and patient to address symptoms of postpartum anxiety, depression, and ptsd.     Focus of session was on Ellie's presenting concern of major depressive disorder symptoms, exacerbated during the premenstrual period. She reported having some thoughts a few weeks ago of \"I shouldn't be here\" but denied suicidal intention, plan, or attempt. She identified her daughter and her family as positive reasons to live and is motivated to decrease depression symptoms.  Provider reviewed psychoeducation on CBT skills of cognitive restructuring and behavioral activation skills and time was spent on problem solving around implementing these skills.  Provider gave supportive counseling and normalized common emotional experiences.      Objective   Social History     Substance and Sexual Activity   Alcohol Use Not on file      Social History     Social History Narrative    Not on file        Assessment/Plan   Problem List Items Addressed This Visit       Generalized anxiety disorder    Major depressive disorder, recurrent (CMS-HCC) - Primary     PLAN: [place] plan made for individual CBT bimonthly to address mood and anxiety and ptsd symptoms and anxiety in response to medical/pelvic conditions.    **referral " placed on 4/26/24 to psychiatry department for medication management.    PRACTICE EXERCISES PROVIDED: read handouts provided on anxiety and trauma. keep list of anxiety triggers/avoidance behaviors. read handout on ACT. followup in 2 weeks. practice cognitive defusion as discussed. practice 'cognitive restructuring' skill (with challenging questions worksheet, cognitive distortions worksheet as discussed).

## 2024-05-03 ENCOUNTER — OFFICE VISIT (OUTPATIENT)
Dept: UROLOGY | Facility: CLINIC | Age: 34
End: 2024-05-03
Payer: COMMERCIAL

## 2024-05-03 ENCOUNTER — TELEMEDICINE (OUTPATIENT)
Dept: BEHAVIORAL HEALTH | Facility: CLINIC | Age: 34
End: 2024-05-03
Payer: COMMERCIAL

## 2024-05-03 DIAGNOSIS — F33.1 MODERATE EPISODE OF RECURRENT MAJOR DEPRESSIVE DISORDER (MULTI): ICD-10-CM

## 2024-05-03 DIAGNOSIS — F41.1 GENERALIZED ANXIETY DISORDER: Primary | ICD-10-CM

## 2024-05-03 DIAGNOSIS — R15.9 INCONTINENCE OF FECES, UNSPECIFIED FECAL INCONTINENCE TYPE: ICD-10-CM

## 2024-05-03 DIAGNOSIS — N39.3 SUI (STRESS URINARY INCONTINENCE, FEMALE): Primary | ICD-10-CM

## 2024-05-03 PROCEDURE — 90837 PSYTX W PT 60 MINUTES: CPT | Performed by: PSYCHOLOGIST

## 2024-05-03 PROCEDURE — 99213 OFFICE O/P EST LOW 20 MIN: CPT | Performed by: STUDENT IN AN ORGANIZED HEALTH CARE EDUCATION/TRAINING PROGRAM

## 2024-05-03 NOTE — PROGRESS NOTES
START TIME: 8:15  END TIME: 9  TOTAL TIME FACE TO FACE: 45mins    Subjective   Patient ID: Ellie Garcia is a 33 y.o. female who presents for   Problem List Items Addressed This Visit       Generalized anxiety disorder - Primary    Major depressive disorder, recurrent (CMS-HCC)   .    Virtual or Telephone Consent    An interactive audio and video telecommunication system which permits real time communications between the patient (at the originating site) and provider (at the distant site) was utilized to provide this telehealth service.   Verbal consent was requested and obtained from Ellie Garcia on this date, 05/03/24 for a telehealth visit.      CONTENT OF SESSION: This was a followup appointment between provider and patient to address symptoms of postpartum anxiety, depression, and ptsd.     Focus of session was on Ellie's continued presenting concern of major depressive disorder symptoms, exacerbated during the premenstrual period. She expressed having some decreased negative thoughts/low mood this past week and attributed this in part to engaging in behavioral activation skills (e.g., went to a comedy show). She has been doing yoga in the mornings and finds this helpful as well. Time was also spent on review of cognitive restructuring skill. Provider reviewed psychoeducation on cognitive restructuring and Ellie demonstrated use of the skill in session Provider gave positive feedback about this skill use.  Provider gave supportive counseling and normalized common emotional experiences.      Objective   Social History     Substance and Sexual Activity   Alcohol Use Not on file      Social History     Social History Narrative    Not on file        Assessment/Plan   Problem List Items Addressed This Visit       Generalized anxiety disorder - Primary    Major depressive disorder, recurrent (CMS-HCC)     PLAN: [place] plan made for individual CBT bimonthly to address mood and anxiety and ptsd  symptoms and anxiety in response to medical/pelvic conditions.    **referral placed on 4/26/24 to psychiatry department for medication management.     PRACTICE EXERCISES PROVIDED: read handouts provided on anxiety and trauma. keep list of anxiety triggers/avoidance behaviors. read handout on ACT. followup in 2 weeks. practice cognitive defusion as discussed. practice 'cognitive restructuring' skill (with challenging questions worksheet, cognitive distortions worksheet as discussed).

## 2024-05-03 NOTE — PROGRESS NOTES
HISTORY OF PRESENT ILLNESS:  Ellie Garcia is a 33 y.o. female who presents today for a follow up visit. She reports she is doing well overall. She has no concerns at this time. Planning on 5K tomorrow.            Past Medical History  She has a past medical history of Encounter for  screening, unspecified ([facility]) (2021), Encounter for removal of intrauterine contraceptive device (2019), Encounter for supervision of normal first pregnancy, first trimester ([facility]) (2020), Encounter for supervision of normal first pregnancy, second trimester ([facility]) (2020), Encounter for supervision of normal first pregnancy, third trimester ([facility]) (2020), Encounter for supervision of other normal pregnancy, first trimester ([facility]) (2021), Encounter for supervision of other normal pregnancy, third trimester ([facility]) (2021), Maternal care for other known or suspected poor fetal growth, unspecified trimester, fetus 1 ([facility]) (2021), Missed  ([facility]) (2020), Personal history of other mental and behavioral disorders, Personal history of other mental and behavioral disorders, Pregnancy with inconclusive fetal viability, not applicable or unspecified ([facility]) (2020), Supervision of pregnancy with other poor reproductive or obstetric history, first trimester ([facility]) (2021), Supervision of pregnancy with other poor reproductive or obstetric history, second trimester ([facility]) (06/10/2021), Supervision of pregnancy with other poor reproductive or obstetric history, third trimester ([facility]) (2021), Supervision of pregnancy with other poor reproductive or obstetric history, unspecified trimester ([facility]) (2021), and Threatened  ([facility]) (02/15/2021).    Surgical History  She has a past surgical history that includes Other surgical history (2014) and Other surgical history (2020).     Social History  She  "has no history on file for tobacco use, alcohol use, and drug use.    Family History  Family History   Problem Relation Name Age of Onset    Other (CVA) Father      Diabetes type II Father's Sister      Diabetes type II Paternal Grandfather      Crohn's disease Other          Allergies  Codeine and Shellfish containing products      A comprehensive 10+ review of systems was negative except for: see hpi                          PHYSICAL EXAMINATION:  BP Readings from Last 3 Encounters:   08/10/23 118/84   07/06/23 118/82   06/23/23 110/80      Wt Readings from Last 3 Encounters:   08/10/23 65.3 kg (144 lb)   07/06/23 63.5 kg (140 lb)   06/23/23 64.9 kg (143 lb)      BMI: Estimated body mass index is 25.11 kg/m² as calculated from the following:    Height as of 8/10/23: 1.613 m (5' 3.5\").    Weight as of 8/10/23: 65.3 kg (144 lb).  BSA: Estimated body surface area is 1.71 meters squared as calculated from the following:    Height as of 8/10/23: 1.613 m (5' 3.5\").    Weight as of 8/10/23: 65.3 kg (144 lb).  HEENT: Normocephalic, atraumatic, PER EOMI, nonicteric, trachea normal, thyroid normal, oropharynx normal.  CARDIAC: regular rate & rhythm, S1 & S2 normal.  No heaves, thrills, gallops or murmurs.  LUNGS: Clear to auscultation, no spinal or CV tenderness.  EXTREMITIES: No evidence of cyanosis, clubbing or edema.               Assessment:  33-year-old with fecal incontinence OASIS status post anal sphincteroplasty in April and with stress incontinence     Uterovaginal Prolapse Stage 2:  most bothered by pressure  plan on PFT      FI:  Has improved dramatically after sphincteroplasty with Dr. Lockwood, with residual FI  stopped all meds, doing great after PT      Stress incontinence:  improved after PT      Follow up in 1 year      All questions and concerns were answered and addressed.  The patient expressed understanding and agrees with the plan.     Guerrero Gabriel MD    Scribe Attestation  By signing my name below, I, " Kingston Curry   attest that this documentation has been prepared under the direction and in the presence of Guerrero Gabriel MD.

## 2024-06-13 ENCOUNTER — APPOINTMENT (OUTPATIENT)
Dept: BEHAVIORAL HEALTH | Facility: CLINIC | Age: 34
End: 2024-06-13
Payer: COMMERCIAL

## 2024-06-13 DIAGNOSIS — F33.1 MODERATE EPISODE OF RECURRENT MAJOR DEPRESSIVE DISORDER (MULTI): Primary | ICD-10-CM

## 2024-06-13 DIAGNOSIS — F41.1 GENERALIZED ANXIETY DISORDER: ICD-10-CM

## 2024-06-13 NOTE — PROGRESS NOTES
START TIME: 8:05 a   END TIME: 9a  TOTAL TIME FACE TO FACE: 55mins    Subjective   Patient ID: Ellie Garcia is a 33 y.o. female who presents for   Problem List Items Addressed This Visit       Generalized anxiety disorder    Major depressive disorder, recurrent (CMS-HCC) - Primary   .    Virtual or Telephone Consent    An interactive audio and video telecommunication system which permits real time communications between the patient (at the originating site) and provider (at the distant site) was utilized to provide this telehealth service.   Verbal consent was requested and obtained from Ellie Garcia on this date, 06/13/24 for a telehealth visit.      CONTENT OF SESSION:  This was a followup appointment (cognitive behavioral therapy) between provider and patient to address symptoms of postpartum anxiety, depression, and ptsd.     Ellie has an appointment scheduled with psychiatrist next week. Time was spent on emotional processing of current/recent psychosocial stressors and responsibilities. Ellie identified common triggers for anxiety, and difficulty relaxing. Provider reviewed psychoeducation on relaxation skills for anxiety and stress management, and on role of habituation to anxiety via gradual exposure. Time was spent identifying potential cognitive restructuring strategies to reduce impact of stress. Ellie also identified several limits to help with reduction of overall stress. Provider gave supportive counseling and normalized common emotional experiences.         Objective   Social History     Substance and Sexual Activity   Alcohol Use Not on file      Social History     Social History Narrative    Not on file        Assessment/Plan   Problem List Items Addressed This Visit       Generalized anxiety disorder    Major depressive disorder, recurrent (CMS-HCC) - Primary     PLAN: [place] plan made for individual CBT bimonthly to address mood and anxiety and ptsd symptoms and anxiety in  response to medical/pelvic conditions.    **referral placed on 4/26/24 to psychiatry department for medication management.     PRACTICE EXERCISES PROVIDED: read handouts provided on anxiety and trauma. keep list of anxiety triggers/avoidance behaviors. read handout on ACT. followup in 2 weeks. practice cognitive defusion as discussed. practice 'cognitive restructuring' skill (with challenging questions worksheet, cognitive distortions worksheet as discussed).

## 2024-06-21 ENCOUNTER — APPOINTMENT (OUTPATIENT)
Dept: BEHAVIORAL HEALTH | Facility: CLINIC | Age: 34
End: 2024-06-21
Payer: COMMERCIAL

## 2024-06-21 DIAGNOSIS — F33.1 MODERATE EPISODE OF RECURRENT MAJOR DEPRESSIVE DISORDER (MULTI): ICD-10-CM

## 2024-06-21 DIAGNOSIS — F32.81 PREMENSTRUAL DYSPHORIA: ICD-10-CM

## 2024-06-21 PROCEDURE — 99204 OFFICE O/P NEW MOD 45 MIN: CPT | Performed by: STUDENT IN AN ORGANIZED HEALTH CARE EDUCATION/TRAINING PROGRAM

## 2024-06-21 RX ORDER — BUPROPION HYDROCHLORIDE 300 MG/1
300 TABLET ORAL DAILY
Qty: 30 TABLET | Refills: 1 | Status: SHIPPED | OUTPATIENT
Start: 2024-06-21 | End: 2024-08-20

## 2024-06-21 RX ORDER — FLUOXETINE 10 MG/1
CAPSULE ORAL
Qty: 14 CAPSULE | Refills: 11 | Status: SHIPPED | OUTPATIENT
Start: 2024-06-21

## 2024-06-21 NOTE — PROGRESS NOTES
Subjective    All Individuals Present: Patient and Provider (Encounter Provider)     ID: Ellie Garcia is a 33 y.o. female presenting for assessment.     Interval History/HPI/PFSH:  Has been seeing Dr. Sommers in context of PPD (3 yo daughter now, Rose Marie).    Now coming d/t worsening passive SI, becoming almost daily, especially exacerbated since last fall. Will get feelings of worthlessness and as if everyone would be better without her.    Had not been on medication for a while, just started again last spring, bupropion does help reduce anxious thoughts. Most med trials in the past caused affective blunting or weight gain. Prior dx of bipolar II based on working out, decreased need for sleep, feeling ramped up.    When got IUD, things got better with pains, but mood instability remained despite not bleeding. Currently has Mirena. Trying to workout to behaviorally manage, but that was hard PP because of perineal complications.    Last symptom period (no bleeding).    Denies HI, AVH.    PMDD screen:     Menses:  First period: 15 yo  Cycle: (x) regular () irregular   Cycle Length: 30 days, 4-7 days of bleeding; gets mood symptoms 10-14 days prior  Flow: heavy  Cramping/pain: painful  D/t pain and heavy flow, was placed on combo BC pill, helped with physical symptoms; first combo pill seemed to help mentally as well.     Menstrual Related Symptoms: () prospective report (x) retrospective report      Affective  ( x ) marked affective lability  ( x ) marked irritability or anger; increased interpersonal conflicts  ( x ) marked depressed mood, feelings of hopeless or self-deprecating thoughts  ( x ) maked tension, anxiety, feeling keyed up or on edge      Behavioral/Physical   (  ) decreased interest in usual activities  ( x ) difficulties with concentration  ( x ) lethargy, fatigue, lack of energy   (  ) changes in appetite  ( x ) hypersomnia or insomnia  ( x ) feeling out of control or overwhelmed  ( x ) physical  symptoms: breast tenderness/swelling, bloating, weight gain, joint/muscle pain, acne       Medication side effects: None Reported     Review of Systems  Constitutional: Positive for fatigue, Negative for fevers  Psychiatric: Positive for anxiety, depression, fatigue, irritability, and mood swings, Negative for learning difficulty and sleep disturbance  Neurological: Negative   Other: irregular menses    Current Outpatient Medications on File Prior to Visit   Medication Sig Dispense Refill    buPROPion XL (Wellbutrin XL) 150 mg 24 hr tablet Take 1 tablet (150 mg) by mouth once daily. Do not crush, chew, or split.      cholecalciferol (Vitamin D-3) 10 MCG (400 UNIT) tablet Take 1 tablet (10 mcg) by mouth once daily.      cholecalciferol (Vitamin D-3) 25 MCG (1000 UT) tablet Take 1 tablet (25 mcg) by mouth once daily.      colestipol (Colestid) 1 gram tablet Take by mouth. Take 1 pill every other day, if not improvement after two weeks, take one daily, if no improvement after that, thke 2 pills daily      diazePAM (Valium) 2 mg tablet 1 tablet (2 mg). Place 1 tablet in vagina as needed before intercourse or pelvic floor therapy up to every 8 hours      levonorgestrel (Mirena) 21 mcg/24 hours (8 yrs) 52 mg IUD by intrauterine route. Inserted November 11,2021      prenatal vit no.124-iron-folic (Prenatal Vitamin) 27 mg iron- 800 mcg tablet Take 1 tablet by mouth once daily.      psyllium (Metamucil, with sugar,) 3.4 gram packet Take by mouth. use 1 packet daily, if still having loose stool after 1 week, go up to 2 packets daily       No current facility-administered medications on file prior to visit.        Past Psychiatric Hx:  Dx: started seeing mental health providers in teenage years, ~14-15 yo starting meds; has been treated for depression and OCD (constant scrubbing/bathing), dx with bipolar II  -No hospitalizations  -SA at 14 yo (hanging), a few more in high school (hanging, potential attempts at overdose); also  SIB by cutting in HS  Rx Trials: citalopram (blunting), trazodone (stopped with pregnancy, v. sedating), sertraline (weight gain), escitalopram, carbamazepine (blunting), lurasidone (weight gain)  Has never been on fluoxetine, lamotrigine    Patient Active Problem List   Diagnosis    Abdominal pain of multiple sites    Chronic sinusitis    Foot pain    Irritable bowel syndrome    Stool incontinence    Stress incontinence of urine    Abdominal pain, bilateral lower quadrant    Deep dyspareunia in female    Generalized anxiety disorder    History of food allergy    Major depressive disorder, recurrent (CMS-HCC)    Muscle spasm    Pelvic floor dysfunction in female    Pelvic pain in female    PID (acute pelvic inflammatory disease)    Vaginal discharge     Past Surgical History:   Procedure Laterality Date    OTHER SURGICAL HISTORY  07/30/2014    ENT Surgical Result - Throat    OTHER SURGICAL HISTORY  05/06/2020    Dilation and curettage     Allergies   Allergen Reactions    Codeine Hives and Itching    Shellfish Containing Products Diarrhea, Swelling, Rash and Other     SEAFOOD     Family History   Problem Relation Name Age of Onset    Other (CVA) Father      Diabetes type II Father's Sister      Diabetes type II Paternal Grandfather      Crohn's disease Other       -paternal aunt with some sort of psychosis/?cluster A?  -another aunt maybe with bipolar  -cousin with addiction    Objective   There were no vitals taken for this visit.  Wt Readings from Last 4 Encounters:   08/10/23 65.3 kg (144 lb)   07/06/23 63.5 kg (140 lb)   06/23/23 64.9 kg (143 lb)   05/16/22 65.8 kg (145 lb)       Mental Status Exam  General Appearance: Well groomed, appropriate eye contact.  Attitude/Behavior: Cooperative, conversant, engaged, and with good eye contact.  Motor: No psychomotor agitation or retardation, no tremor or other abnormal movements.  Speech: Normal rate, volume, prosody  Gait/Station: Within normal limits  Mood:  depressed.  Affect: Dysphoric, constricted , Anxious, and Congruent with mood and topic of conversation  Thought Process: Linear, goal directed  Thought Associations: No loosening of associations  Thought Content:  passive SI (no plan nor intent, contracts for safety); denies HI, no delusions  Sensorium: Alert and oriented to person, place, time and situation  Insight: Fair, as evidenced by awareness of symptom patterns  Judgment: Fair  Cognition: Cognitively intact to conversational testing with respect to attention, orientation, fund of knowledge, recent and remote memory, and language.  Testing: N/A.    Laboratory/Imaging/Diagnostic Tests       Assessment/Plan   Overall Formulation and Differential Diagnosis:  Ellie Garcia is a 33 y.o. female who meets criteria for provisional PMDD based on retrospective data.    Interval Assessment:  Progressive worsening of premenstrual affective symptoms (including daily passive SI), physical symptoms, and functional impairment. Currently has hormonal IUD, will try premenstrual SRI dosing for symptoms. Continued baseline depression as well and will increase bupropion. Able to contract for safety.    -increase bupropion to 300 mg PO daily  -trial fluoxetine 10 mg PO daily for 14 days prior to menses  -start prospective menstrual tracking  -RTC 6 weeks    Risk Assessment:  Imminent Risk of Suicide or Serious Self-Injury: Low Risk -- Risk factors include: History of self harm , History of suicide attempt , and Medical illness comorbidity  Protective factors include:Future-oriented talk , Willingness to seek help and support , Skills in problem solving, conflict resolution, and nonviolent handling of disputes, Cultural and Spiritism beliefs that discourage suicide and support self-preservation , Access to a variety of clinical interventions , Receiving and engaged in care for mental, physical, and substance use disorders , History of adhering to treatment recommendations  and/or prescribed medication regimen , Support through ongoing medical and mental healthcare relationships , Interpersonal relationships and supports, e.g., family, friends, peers, community , and Restricted access to firearms or other lethal means of suicide   Imminent Risk of Violence or Homicide: Low Risk - Risk factors include: No significant risk factors identified on screening. Protective factors include: Lack of known history of harm to others , Lack of known history of violent ideation , and Lack of known access to firearms   Treatment Plan:  There are no recently modified care plans to display for this patient.      Attestation Statements   Number of Minutes Spent Performing Evaluation & Management (E&M): 60

## 2024-06-24 PROBLEM — N81.4 UTEROVAGINAL PROLAPSE: Status: ACTIVE | Noted: 2024-06-24

## 2024-06-24 PROBLEM — N73.0 PID (ACUTE PELVIC INFLAMMATORY DISEASE): Status: RESOLVED | Noted: 2023-08-28 | Resolved: 2024-06-24

## 2024-06-24 PROBLEM — Z97.5 IUD (INTRAUTERINE DEVICE) IN PLACE: Status: ACTIVE | Noted: 2024-06-24

## 2024-06-24 PROBLEM — Z01.419 WELL WOMAN EXAM WITH ROUTINE GYNECOLOGICAL EXAM: Status: ACTIVE | Noted: 2024-06-24

## 2024-06-24 NOTE — ASSESSMENT & PLAN NOTE
Pap is sent with HPV.  Regular exercise and attaining/maintaining a healthy weight is encouraged.   Adequate calcium intake with diet or supplements is encouraged.    We will notify of any abnormal results.

## 2024-06-24 NOTE — PROGRESS NOTES
Subjective   Patient ID: Ellie Garcia is a 33 y.o. female who presents for Annual Exam.  2019 pap returned negative.  Prior vaginal delivery was complicated by 4th degree perineal laceration. She has uterovaginal prolapse stage 2 and is s/p anal sphincteroplasty 2022. Fecal and urinary incontinence have improved with pelvic floor PT and she has seen Dr. Guerrero Gabriel for this with most recent appointment 5/3/2024.  She has had a Mirena IUD since 2021 and has no menses.     She notes intermittent pelvic pain. This is not like cramping. She has seen urogynecology and gastroenterology with no cause identified. She is s/p pelvic floor physical therapy with improvement in uterine prolapse. This is a constant steady pain that lasts a few days. This is in low abdomen and not to either side. She has not noted a pattern to the pain. Sometimes this is severe and interferes with activity. She has not found a relation to food, activity, bowels. This first occurred with onset of prolapse and has improved some with improvement in prolapse.               Review of Systems   Constitutional:  Negative for activity change.   HENT:  Negative for congestion.    Respiratory:  Negative for apnea and cough.    Cardiovascular:  Negative for chest pain.   Gastrointestinal:  Negative for constipation and diarrhea.   Genitourinary:  Negative for hematuria and vaginal pain.   Musculoskeletal:  Negative for joint swelling.   Neurological:  Negative for dizziness.   Psychiatric/Behavioral:  Negative for agitation.        Past Medical History:   Diagnosis Date    Encounter for  screening, unspecified ([facility]) 2021     screening encounter    Encounter for removal of intrauterine contraceptive device 2019    Encounter for removal of intrauterine contraceptive device (IUD)    Encounter for supervision of normal first pregnancy, first trimester ([facility]) 2020    Primigravida in first trimester     Encounter for supervision of normal first pregnancy, second trimester ([facility]) 2020    Primigravida in second trimester    Encounter for supervision of normal first pregnancy, third trimester ([facility]) 2020    Primigravida in third trimester    Encounter for supervision of other normal pregnancy, first trimester ([facility]) 2021    Multigravida in first trimester    Encounter for supervision of other normal pregnancy, third trimester ([facility]) 2021    Multigravida in third trimester    Maternal care for other known or suspected poor fetal growth, unspecified trimester, fetus 1 ([facility]) 2021    SGA (small for gestational age), fetal, affecting care of mother, antepartum, unspecified trimester, fetus 1    Missed  ([facility]) 2020    Missed  with fetal demise before 20 completed weeks of gestation    Personal history of other mental and behavioral disorders     History of anxiety    Personal history of other mental and behavioral disorders     History of depression    PID (acute pelvic inflammatory disease) 2023    Pregnancy with inconclusive fetal viability, not applicable or unspecified ([facility]) 2020    Pregnancy with inconclusive fetal viability    Supervision of pregnancy with other poor reproductive or obstetric history, first trimester ([facility]) 2021    Current pregnancy in first trimester with history of spontaneous  during prior pregnancy    Supervision of pregnancy with other poor reproductive or obstetric history, second trimester ([facility]) 06/10/2021    Current pregnancy in second trimester with history of spontaneous  during prior pregnancy    Supervision of pregnancy with other poor reproductive or obstetric history, third trimester ([facility]) 2021    Current pregnancy in third trimester with history of spontaneous  during prior pregnancy    Supervision of pregnancy with other poor reproductive or obstetric  history, unspecified trimester ([facility]) 2021    Pregnancy with history of miscarriage    Threatened  ([facility]) 02/15/2021    Threatened , antepartum      Past Surgical History:   Procedure Laterality Date    OTHER SURGICAL HISTORY  2014    ENT Surgical Result - Throat    OTHER SURGICAL HISTORY  2020    Dilation and curettage      Allergies   Allergen Reactions    Codeine Hives and Itching    Shellfish Containing Products Diarrhea, Swelling, Rash and Other     SEAFOOD      Current Outpatient Medications on File Prior to Visit   Medication Sig Dispense Refill    buPROPion XL (Wellbutrin XL) 300 mg 24 hr tablet Take 1 tablet (300 mg) by mouth once daily. Do not crush, chew, or split. 30 tablet 1    colestipol (Colestid) 1 gram tablet Take by mouth. Take 1 pill every other day, if not improvement after two weeks, take one daily, if no improvement after that, thke 2 pills daily      diazePAM (Valium) 2 mg tablet 1 tablet (2 mg). Place 1 tablet in vagina as needed before intercourse or pelvic floor therapy up to every 8 hours      FLUoxetine (PROzac) 10 mg capsule Take 1 capsule PO daily for 14 days prior to menses 14 capsule 11    levonorgestrel (Mirena) 21 mcg/24 hours (8 yrs) 52 mg IUD by intrauterine route. Inserted       psyllium (Metamucil, with sugar,) 3.4 gram packet Take by mouth. use 1 packet daily, if still having loose stool after 1 week, go up to 2 packets daily      cholecalciferol (Vitamin D-3) 10 MCG (400 UNIT) tablet Take 1 tablet (10 mcg) by mouth once daily.      cholecalciferol (Vitamin D-3) 25 MCG (1000 UT) tablet Take 1 tablet (25 mcg) by mouth once daily.      prenatal vit no.124-iron-folic (Prenatal Vitamin) 27 mg iron- 800 mcg tablet Take 1 tablet by mouth once daily.      [DISCONTINUED] buPROPion XL (Wellbutrin XL) 150 mg 24 hr tablet Take 1 tablet (150 mg) by mouth once daily. Do not crush, chew, or split.       No current facility-administered  medications on file prior to visit.        Objective   Physical Exam  Constitutional:       Appearance: Normal appearance.   Neck:      Thyroid: No thyromegaly.   Cardiovascular:      Rate and Rhythm: Normal rate and regular rhythm.      Heart sounds: Normal heart sounds.   Pulmonary:      Effort: Pulmonary effort is normal.      Breath sounds: Normal breath sounds.   Chest:      Chest wall: No mass.   Breasts:     Right: Normal. No inverted nipple, mass, nipple discharge or skin change.      Left: Normal. No inverted nipple, mass, nipple discharge or skin change.   Abdominal:      General: There is no distension.      Palpations: Abdomen is soft. There is no mass.      Tenderness: There is no abdominal tenderness.   Genitourinary:     General: Normal vulva.      Exam position: Lithotomy position.      Labia:         Right: No rash.         Left: No rash.       Urethra: No urethral lesion.      Vagina: Normal. No lesions.      Cervix: No friability or lesion.      Uterus: Normal. Not enlarged and not tender.       Adnexa: Right adnexa normal and left adnexa normal.        Right: No mass or tenderness.          Left: No mass or tenderness.        Comments: IUD strings are noted. Mild uterine prolapse is noted.   Musculoskeletal:         General: No deformity.      Cervical back: Neck supple.   Lymphadenopathy:      Cervical: No cervical adenopathy.   Skin:     General: Skin is warm and dry.      Findings: No rash.   Neurological:      General: No focal deficit present.      Mental Status: She is alert.   Psychiatric:         Mood and Affect: Mood normal.         Behavior: Behavior is cooperative.         Thought Content: Thought content normal.           Problem List Items Addressed This Visit       Well woman exam with routine gynecological exam - Primary    Overview     12/12/2019 pap returned negative.  She has uterovaginal prolapse stage 2 and is s/p anal sphincteroplasty 4/2022. Fecal and urinary incontinence  have improved with pelvic floor PT.  Mirena IUD since 11/11/2021.          Current Assessment & Plan     Pap is sent with HPV.  Regular exercise and attaining/maintaining a healthy weight is encouraged.   Adequate calcium intake with diet or supplements is encouraged.    We will notify of any abnormal results.

## 2024-06-27 ENCOUNTER — APPOINTMENT (OUTPATIENT)
Dept: OBSTETRICS AND GYNECOLOGY | Facility: CLINIC | Age: 34
End: 2024-06-27
Payer: COMMERCIAL

## 2024-06-27 VITALS
BODY MASS INDEX: 23.56 KG/M2 | SYSTOLIC BLOOD PRESSURE: 118 MMHG | HEIGHT: 64 IN | DIASTOLIC BLOOD PRESSURE: 80 MMHG | WEIGHT: 138 LBS

## 2024-06-27 DIAGNOSIS — Z01.419 WELL WOMAN EXAM WITH ROUTINE GYNECOLOGICAL EXAM: Primary | ICD-10-CM

## 2024-06-27 PROCEDURE — 1036F TOBACCO NON-USER: CPT | Performed by: OBSTETRICS & GYNECOLOGY

## 2024-06-27 PROCEDURE — 87624 HPV HI-RISK TYP POOLED RSLT: CPT

## 2024-06-27 PROCEDURE — 87491 CHLMYD TRACH DNA AMP PROBE: CPT

## 2024-06-27 PROCEDURE — 87591 N.GONORRHOEAE DNA AMP PROB: CPT

## 2024-06-27 PROCEDURE — 99395 PREV VISIT EST AGE 18-39: CPT | Performed by: OBSTETRICS & GYNECOLOGY

## 2024-06-27 ASSESSMENT — ENCOUNTER SYMPTOMS
DIZZINESS: 0
JOINT SWELLING: 0
ACTIVITY CHANGE: 0
AGITATION: 0
APNEA: 0
HEMATURIA: 0
COUGH: 0
DIARRHEA: 0
CONSTIPATION: 0

## 2024-06-29 LAB
C TRACH RRNA SPEC QL NAA+PROBE: NEGATIVE
N GONORRHOEA DNA SPEC QL PROBE+SIG AMP: NEGATIVE

## 2024-07-09 LAB
CYTOLOGY CMNT CVX/VAG CYTO-IMP: NORMAL
HPV HR 12 DNA GENITAL QL NAA+PROBE: NEGATIVE
HPV HR GENOTYPES PNL CVX NAA+PROBE: NEGATIVE
HPV16 DNA SPEC QL NAA+PROBE: NEGATIVE
HPV18 DNA SPEC QL NAA+PROBE: NEGATIVE
LAB AP HPV GENOTYPE QUESTION: YES
LAB AP HPV HR: NORMAL
LAB AP PAP ADDITIONAL TESTS: NORMAL
LABORATORY COMMENT REPORT: NORMAL
PATH REPORT.TOTAL CANCER: NORMAL

## 2024-07-11 ENCOUNTER — APPOINTMENT (OUTPATIENT)
Dept: BEHAVIORAL HEALTH | Facility: CLINIC | Age: 34
End: 2024-07-11
Payer: COMMERCIAL

## 2024-07-11 DIAGNOSIS — F33.1 MODERATE EPISODE OF RECURRENT MAJOR DEPRESSIVE DISORDER (MULTI): ICD-10-CM

## 2024-07-11 DIAGNOSIS — F41.1 GENERALIZED ANXIETY DISORDER: Primary | ICD-10-CM

## 2024-07-11 PROCEDURE — 90837 PSYTX W PT 60 MINUTES: CPT | Performed by: PSYCHOLOGIST

## 2024-07-11 NOTE — PROGRESS NOTES
START TIME: 8:05   END TIME: 9  TOTAL TIME FACE TO FACE: 55mins    Subjective   Patient ID: Ellie Garcia is a 33 y.o. female who presents for   Problem List Items Addressed This Visit       Generalized anxiety disorder - Primary    Major depressive disorder, recurrent (CMS-HCC)   .    Virtual or Telephone Consent    An interactive audio and video telecommunication system which permits real time communications between the patient (at the originating site) and provider (at the distant site) was utilized to provide this telehealth service.   Verbal consent was requested and obtained from Ellie Garcia on this date, 07/11/24 for a telehealth visit.      CONTENT OF SESSION: This was a followup appointment (cognitive behavioral therapy) between provider and patient to address symptoms of postpartum anxiety, depression, and ptsd.       Ellie reported significant improvement in PMDD and anxiety symptoms since last appointment. She met with Dr. Guo (psychiatry) and increased dose of welbutrin and initiated prozac- she is noticing a positive impact. She described several recent incidents of encountering typical anxiety triggers with lower distress. Ellie also discussed her use of recent interpersonal boundary and limit setting skills and Provider gave positive feedback about this skill use. Time was spent on problem solving around strategies to maintain skill use. Provider gave supportive counseling and normalized common emotional experiences.    Objective   Social History     Substance and Sexual Activity   Alcohol Use Yes    Comment: rare      Social History     Social History Narrative    Not on file        Assessment/Plan   Problem List Items Addressed This Visit       Generalized anxiety disorder - Primary    Major depressive disorder, recurrent (CMS-HCC)     PLAN: [place] plan made for individual CBT bimonthly to address mood and anxiety and ptsd symptoms and anxiety in response to medical/pelvic  conditions.    **referral placed on 4/26/24 to psychiatry department for medication management.     PRACTICE EXERCISES PROVIDED: read handouts provided on anxiety and trauma. keep list of anxiety triggers/avoidance behaviors. read handout on ACT. followup in 2 weeks. practice cognitive defusion as discussed. practice 'cognitive restructuring' skill (with challenging questions worksheet, cognitive distortions worksheet as discussed).

## 2024-08-01 ENCOUNTER — APPOINTMENT (OUTPATIENT)
Dept: BEHAVIORAL HEALTH | Facility: CLINIC | Age: 34
End: 2024-08-01
Payer: COMMERCIAL

## 2024-08-01 DIAGNOSIS — F41.1 GENERALIZED ANXIETY DISORDER: Primary | ICD-10-CM

## 2024-08-01 DIAGNOSIS — F33.1 MODERATE EPISODE OF RECURRENT MAJOR DEPRESSIVE DISORDER (MULTI): ICD-10-CM

## 2024-08-01 PROCEDURE — 90837 PSYTX W PT 60 MINUTES: CPT | Performed by: PSYCHOLOGIST

## 2024-08-01 NOTE — PROGRESS NOTES
START TIME: 8:05   END TIME: 9  TOTAL TIME FACE TO FACE: 55mins    Subjective   Patient ID: Ellie Garcia is a 34 y.o. female who presents for   Problem List Items Addressed This Visit       Generalized anxiety disorder - Primary    Major depressive disorder, recurrent (CMS-HCC)   .    Virtual or Telephone Consent    An interactive audio and video telecommunication system which permits real time communications between the patient (at the originating site) and provider (at the distant site) was utilized to provide this telehealth service.   Verbal consent was requested and obtained from Ellie Garcia on this date, 08/01/24 for a telehealth visit.      CONTENT OF SESSION: This was a followup appointment (cognitive behavioral therapy) between provider and patient to address symptoms of postpartum anxiety, depression, and ptsd.     SYMPTOMS: fatigue, difficulty sleeping, difficulty relaxing    Time was spent discussing Ellie's monitoring of PMDD symptoms and mood/energy changes in context of menstrual cycle. Time was spent on problem solving around strategies to implement activity pacing and behavioral activation- provider gave psychoeducation on these skills. Time was also spent on problem solving around strategies to increase opportunities for mindfulness (not 'multitasking') and relaxation to reduce overall stress. Time was also spent on emotional processing of recent stressors. Provider gave supportive counseling and normalized common emotional experiences.    Objective   Social History     Substance and Sexual Activity   Alcohol Use Yes    Comment: rare      Social History     Social History Narrative    Not on file        Assessment/Plan   Problem List Items Addressed This Visit       Generalized anxiety disorder - Primary    Major depressive disorder, recurrent (CMS-HCC)     PLAN: [place] plan made for individual CBT bimonthly to address mood and anxiety and ptsd symptoms and anxiety in response to  medical/pelvic conditions.    **referral placed on 4/26/24 to psychiatry department for medication management.     PRACTICE EXERCISES PROVIDED: read handouts provided on anxiety and trauma. keep list of anxiety triggers/avoidance behaviors. read handout on ACT. followup in 2 weeks. practice cognitive defusion as discussed. practice 'cognitive restructuring' skill (with challenging questions worksheet, cognitive distortions worksheet as discussed).

## 2024-08-15 ENCOUNTER — APPOINTMENT (OUTPATIENT)
Dept: BEHAVIORAL HEALTH | Facility: CLINIC | Age: 34
End: 2024-08-15
Payer: COMMERCIAL

## 2024-08-15 DIAGNOSIS — F33.1 MODERATE EPISODE OF RECURRENT MAJOR DEPRESSIVE DISORDER (MULTI): Primary | ICD-10-CM

## 2024-08-15 DIAGNOSIS — F41.1 GENERALIZED ANXIETY DISORDER: ICD-10-CM

## 2024-08-15 PROCEDURE — 90837 PSYTX W PT 60 MINUTES: CPT | Performed by: PSYCHOLOGIST

## 2024-08-15 NOTE — PROGRESS NOTES
START TIME: 8:05   END TIME: 9  TOTAL TIME FACE TO FACE: 55mins    Subjective   Patient ID: Ellie Garcia is a 34 y.o. female who presents for   Problem List Items Addressed This Visit       Generalized anxiety disorder    Major depressive disorder, recurrent (CMS-HCC) - Primary   .    Virtual or Telephone Consent    An interactive audio and video telecommunication system which permits real time communications between the patient (at the originating site) and provider (at the distant site) was utilized to provide this telehealth service.   Verbal consent was requested and obtained from Ellie Garcia on this date, 08/15/24 for a telehealth visit.      CONTENT OF SESSION: This was a followup appointment (cognitive behavioral therapy) between provider and patient to address symptoms of postpartum anxiety, depression, and ptsd.     SYMPTOMS: fatigue, difficulty sleeping, difficulty relaxing     Time was spent on discussion of Ellie's presenting concern of PMDD symptoms and variations in symptoms. She has started tracking/monitoring her symptoms to identify triggers and warning signs of PMDD depressive episodes.  Ellie plans to also discuss these symptoms with her psychiatrist, Dr. Guo. She is finding medication to be helpful for PMDD symptoms in general . She also identified several-day periods where she has higher energy and motivation, and later “crashes” (in terms of mood/energy).  Provider gave psychoeducation on activity pacing and behavioral activation as it relates to mood management and paced engagement in mastery, values, and pleasant based activities. Ellie also identified negative self-judgment thoughts associated with low mood. Provider used Socratic questioning to facilitate cognitive restructuring of potential unrealistic or unhelpful cognitions and patient was able to engage in cognitive restructuring. Provider gave supportive counseling and normalized common emotional experiences.      Objective   Social History     Substance and Sexual Activity   Alcohol Use Yes    Comment: rare      Social History     Social History Narrative    Not on file        Assessment/Plan   Problem List Items Addressed This Visit       Generalized anxiety disorder    Major depressive disorder, recurrent (CMS-HCC) - Primary     PLAN: [place] plan made for individual CBT bimonthly to address mood and anxiety and ptsd symptoms and anxiety in response to medical/pelvic conditions.    Ellie will continue with Dr. Audi Guo for psychiatry medication management.     PRACTICE EXERCISES PROVIDED: read handouts provided on anxiety and trauma. keep list of anxiety triggers/avoidance behaviors. read handout on ACT. followup in 2 weeks. practice cognitive defusion as discussed. practice 'cognitive restructuring' skill (with challenging questions worksheet, cognitive distortions worksheet as discussed).

## 2024-08-23 ENCOUNTER — TELEPHONE (OUTPATIENT)
Dept: OBSTETRICS AND GYNECOLOGY | Facility: CLINIC | Age: 34
End: 2024-08-23
Payer: COMMERCIAL

## 2024-08-23 NOTE — TELEPHONE ENCOUNTER
Good Morning ,  I'm Nicki from Dr. Rosa Sommers's office.     Patient calling to request a refill of her   buPROPion XL (Wellbutrin XL) 300 mg 24 hr tablet   She also noticed that the medication from canceled in her chart   She does have an upcoming appointment on 9/13/24  Pharmacy info has been verified .  .    Nicki Galdamez

## 2024-08-29 ENCOUNTER — APPOINTMENT (OUTPATIENT)
Dept: BEHAVIORAL HEALTH | Facility: CLINIC | Age: 34
End: 2024-08-29
Payer: COMMERCIAL

## 2024-08-29 DIAGNOSIS — F41.1 GENERALIZED ANXIETY DISORDER: ICD-10-CM

## 2024-08-29 DIAGNOSIS — F33.1 MODERATE EPISODE OF RECURRENT MAJOR DEPRESSIVE DISORDER (MULTI): ICD-10-CM

## 2024-08-29 DIAGNOSIS — F33.1 MODERATE EPISODE OF RECURRENT MAJOR DEPRESSIVE DISORDER (MULTI): Primary | ICD-10-CM

## 2024-08-29 PROCEDURE — 90837 PSYTX W PT 60 MINUTES: CPT | Performed by: PSYCHOLOGIST

## 2024-08-29 RX ORDER — BUPROPION HYDROCHLORIDE 300 MG/1
300 TABLET ORAL DAILY
Qty: 30 TABLET | Refills: 11 | Status: SHIPPED | OUTPATIENT
Start: 2024-08-29 | End: 2025-08-29

## 2024-08-29 NOTE — PROGRESS NOTES
START TIME: 8:05   END TIME: 9  TOTAL TIME FACE TO FACE: 55mins    Subjective   Patient ID: Ellie Garcia is a 34 y.o. female who presents for   Problem List Items Addressed This Visit       Generalized anxiety disorder    Major depressive disorder, recurrent (CMS-HCC) - Primary   .    Virtual or Telephone Consent    An interactive audio and video telecommunication system which permits real time communications between the patient (at the originating site) and provider (at the distant site) was utilized to provide this telehealth service.   Verbal consent was requested and obtained from Ellie Garcia on this date, 08/29/24 for a telehealth visit.      CONTENT OF SESSION: This was a followup appointment (cognitive behavioral therapy) between provider and patient to address symptoms of postpartum anxiety, depression, and ptsd.     SYMPTOMS: fatigue, low mood, anxiety, worry thoughts.    Ellie noted she needs a refill for welbutrin- this provider notified Dr. Guo (prescribing provider) about this request.    Ellie reported acute anxiety and low mood reflective of PMDD symptoms. Provider gave psychoeducation on common symptoms of PMDD. Ellie described her use of cognitive restructuring and defusion skills to manage unhelpful cognition. Provider gave positive feedback about this skill use. Time was also spent on emotional processing of recent stressors and adjustments (e.g., work place relocation). Time was spent on review of activity pacing and behavioral activation skills for mood management- Ellie discussed her completed activity monitoring form and noticed a pattern of higher energy right before her premenstrual phase of cycle.    Objective   Social History     Substance and Sexual Activity   Alcohol Use Yes    Comment: rare      Social History     Social History Narrative    Not on file        Assessment/Plan   Problem List Items Addressed This Visit       Generalized anxiety disorder    Major  depressive disorder, recurrent (CMS-HCC) - Primary     PLAN: [place] plan made for individual CBT bimonthly to address mood and anxiety and ptsd symptoms and anxiety in response to medical/pelvic conditions.    Ellie will continue with Dr. Audi Guo for psychiatry medication management.     PRACTICE EXERCISES PROVIDED: read handouts provided on anxiety and trauma. keep list of anxiety triggers/avoidance behaviors. read handout on ACT. followup in 2 weeks. practice cognitive defusion as discussed. practice 'cognitive restructuring' skill (with challenging questions worksheet, cognitive distortions worksheet as discussed).

## 2024-09-04 ENCOUNTER — APPOINTMENT (OUTPATIENT)
Dept: BEHAVIORAL HEALTH | Facility: CLINIC | Age: 34
End: 2024-09-04
Payer: COMMERCIAL

## 2024-09-12 ENCOUNTER — APPOINTMENT (OUTPATIENT)
Dept: BEHAVIORAL HEALTH | Facility: CLINIC | Age: 34
End: 2024-09-12
Payer: COMMERCIAL

## 2024-09-12 DIAGNOSIS — F33.1 MODERATE EPISODE OF RECURRENT MAJOR DEPRESSIVE DISORDER (MULTI): Primary | ICD-10-CM

## 2024-09-12 DIAGNOSIS — F41.1 GENERALIZED ANXIETY DISORDER: ICD-10-CM

## 2024-09-12 PROCEDURE — 90837 PSYTX W PT 60 MINUTES: CPT | Performed by: PSYCHOLOGIST

## 2024-09-12 NOTE — PROGRESS NOTES
START TIME: 8:05   END TIME: 9  TOTAL TIME FACE TO FACE: 55mins    Subjective   Patient ID: Ellie Garcia is a 34 y.o. female who presents for   Problem List Items Addressed This Visit       Generalized anxiety disorder    Major depressive disorder, recurrent (CMS-HCC) - Primary   .    Virtual or Telephone Consent    An interactive audio and video telecommunication system which permits real time communications between the patient (at the originating site) and provider (at the distant site) was utilized to provide this telehealth service.   Verbal consent was requested and obtained from Ellie Garcia on this date, 09/12/24 for a telehealth visit.      Patient at home during visit.     CONTENT OF SESSION:   This was a followup appointment (cognitive behavioral therapy) between provider and patient to address symptoms of postpartum anxiety, depression, and ptsd.     SYMPTOMS: fatigue, low mood, anxiety, worry thoughts.      Ellie was generally euthymic during session. She has been taking medication for PMDD symptoms- noticed the first cycle, it was more helpful than in subsequent cycles. She meets with Dr. Guo later this week and will discuss with him.  She discussed recent acute stressors that could be anxiety triggers- she described her appropriate use of assertive communication and limit setting to manage potential distress. Provider gave positive feedback about this skill use.  Ellie also discussed her current and future plans for behavioral activation to maintain mood management-- e.g., she is going to a concert tonight; has some scheduled self-care time.   Provider gave supportive counseling and normalized common emotional experiences.   Objective   Social History     Substance and Sexual Activity   Alcohol Use Yes    Comment: rare      Social History     Social History Narrative    Not on file        Assessment/Plan   Problem List Items Addressed This Visit       Generalized anxiety disorder     Major depressive disorder, recurrent (CMS-HCC) - Primary     PLAN: [place] plan made for individual CBT bimonthly to address mood and anxiety and ptsd symptoms and anxiety in response to medical/pelvic conditions.    Ellie will continue with Dr. Audi Guo for psychiatry medication management.     PRACTICE EXERCISES PROVIDED: read handouts provided on anxiety and trauma. keep list of anxiety triggers/avoidance behaviors. read handout on ACT. followup in 2 weeks. practice cognitive defusion as discussed. practice 'cognitive restructuring' skill (with challenging questions worksheet, cognitive distortions worksheet as discussed).

## 2024-09-13 ENCOUNTER — APPOINTMENT (OUTPATIENT)
Dept: BEHAVIORAL HEALTH | Facility: CLINIC | Age: 34
End: 2024-09-13
Payer: COMMERCIAL

## 2024-09-13 DIAGNOSIS — F33.1 MODERATE EPISODE OF RECURRENT MAJOR DEPRESSIVE DISORDER (MULTI): ICD-10-CM

## 2024-09-13 DIAGNOSIS — F32.81 PREMENSTRUAL DYSPHORIA: ICD-10-CM

## 2024-09-13 PROCEDURE — 99214 OFFICE O/P EST MOD 30 MIN: CPT | Performed by: STUDENT IN AN ORGANIZED HEALTH CARE EDUCATION/TRAINING PROGRAM

## 2024-09-13 RX ORDER — LORAZEPAM 0.5 MG/1
0.5 TABLET ORAL DAILY PRN
Qty: 10 TABLET | Refills: 0 | Status: SHIPPED | OUTPATIENT
Start: 2024-09-13 | End: 2024-10-13

## 2024-09-13 NOTE — PROGRESS NOTES
Subjective    All Individuals Present: Patient and Provider (Encounter Provider)     ID: Ellie Garcia is a 34 y.o. female presenting for assessment.     Interval History/HPI/PFSH:  Has had 3 cycles since last appt. Some difficulty planning exactly what day to start fluoxetine. The first cycle, noticed immediate benefit from fluoxetine, second cycle was more intense and fluoxetine provided some respite but not full resolution. Did not notice any blunting for those two weeks.    Only has panic attacks 2-3x/year. Will feel tightness moving from stomach to sternum before knows it is moving into panic.    Looking forward to Halloween.    No SI, HI, AVH.    *Has been seeing Dr. Sommers in context of PPD (3 yo daughter now, Rose Marie).    Now coming d/t worsening passive SI, becoming almost daily, especially exacerbated since last fall. Will get feelings of worthlessness and as if everyone would be better without her.    Had not been on medication for a while, just started again last spring, bupropion does help reduce anxious thoughts. Most med trials in the past caused affective blunting or weight gain. Prior dx of bipolar II based on working out, decreased need for sleep, feeling ramped up.    When got IUD, things got better with pains, but mood instability remained despite not bleeding. Currently has Mirena. Trying to workout to behaviorally manage, but that was hard PP because of perineal complications.    Last symptom period (no bleeding).    Denies HI, AVH.    PMDD screen:     Menses:  First period: 13 yo  Cycle: (x) regular () irregular   Cycle Length: 30 days, 4-7 days of bleeding; gets mood symptoms 10-14 days prior  Flow: heavy  Cramping/pain: painful  D/t pain and heavy flow, was placed on combo BC pill, helped with physical symptoms; first combo pill seemed to help mentally as well.     Menstrual Related Symptoms: () prospective report (x) retrospective report      Affective  ( x ) marked affective lability  (  x ) marked irritability or anger; increased interpersonal conflicts  ( x ) marked depressed mood, feelings of hopeless or self-deprecating thoughts  ( x ) maked tension, anxiety, feeling keyed up or on edge      Behavioral/Physical   (  ) decreased interest in usual activities  ( x ) difficulties with concentration  ( x ) lethargy, fatigue, lack of energy   (  ) changes in appetite  ( x ) hypersomnia or insomnia  ( x ) feeling out of control or overwhelmed  ( x ) physical symptoms: breast tenderness/swelling, bloating, weight gain, joint/muscle pain, acne       Medication side effects: None Reported     Review of Systems  Constitutional: Positive for fatigue, Negative for fevers  Psychiatric: Positive for anxiety, depression, fatigue, irritability, and mood swings, Negative for learning difficulty and sleep disturbance  Neurological: Negative   Other: irregular menses    Current Outpatient Medications on File Prior to Visit   Medication Sig Dispense Refill    buPROPion XL (Wellbutrin XL) 300 mg 24 hr tablet Take 1 tablet (300 mg) by mouth once daily. Do not crush, chew, or split. 30 tablet 11    colestipol (Colestid) 1 gram tablet Take by mouth. Take 1 pill every other day, if not improvement after two weeks, take one daily, if no improvement after that, thke 2 pills daily      diazePAM (Valium) 2 mg tablet 1 tablet (2 mg). Place 1 tablet in vagina as needed before intercourse or pelvic floor therapy up to every 8 hours      FLUoxetine (PROzac) 10 mg capsule Take 1 capsule PO daily for 14 days prior to menses 14 capsule 11    levonorgestrel (Mirena) 21 mcg/24 hours (8 yrs) 52 mg IUD by intrauterine route. Inserted November 11,2021      psyllium (Metamucil, with sugar,) 3.4 gram packet Take by mouth. use 1 packet daily, if still having loose stool after 1 week, go up to 2 packets daily       No current facility-administered medications on file prior to visit.        Past Psychiatric Hx:  Dx: started seeing mental  health providers in teenage years, ~14-15 yo starting meds; has been treated for depression and OCD (constant scrubbing/bathing), dx with bipolar II  -No hospitalizations  -SA at 14 yo (hanging), a few more in high school (hanging, potential attempts at overdose); also SIB by cutting in HS  Rx Trials: citalopram (blunting), trazodone (stopped with pregnancy, v. sedating), sertraline (weight gain), escitalopram, carbamazepine (blunting), lurasidone (weight gain)  Has never been on fluoxetine, lamotrigine    Patient Active Problem List   Diagnosis    Abdominal pain of multiple sites    Chronic sinusitis    Foot pain    Irritable bowel syndrome    Stool incontinence    Stress incontinence of urine    Abdominal pain, bilateral lower quadrant    Deep dyspareunia in female    Generalized anxiety disorder    History of food allergy    Major depressive disorder, recurrent (CMS-HCC)    Muscle spasm    Pelvic floor dysfunction in female    Pelvic pain in female    Vaginal discharge    Well woman exam with routine gynecological exam    Uterovaginal prolapse    IUD (intrauterine device) in place     Past Surgical History:   Procedure Laterality Date    OTHER SURGICAL HISTORY  07/30/2014    ENT Surgical Result - Throat    OTHER SURGICAL HISTORY  05/06/2020    Dilation and curettage     Allergies   Allergen Reactions    Codeine Hives and Itching    Shellfish Containing Products Diarrhea, Swelling, Rash and Other     SEAFOOD     Family History   Problem Relation Name Age of Onset    Other (CVA) Father      Diabetes type II Father's Sister      Diabetes type II Paternal Grandfather      Crohn's disease Other       -paternal aunt with some sort of psychosis/?cluster A?  -another aunt maybe with bipolar  -cousin with addiction    Objective   There were no vitals taken for this visit.  Wt Readings from Last 4 Encounters:   06/27/24 62.6 kg (138 lb)   08/10/23 65.3 kg (144 lb)   07/06/23 63.5 kg (140 lb)   06/23/23 64.9 kg (143 lb)  "      Mental Status Exam  General Appearance: Well groomed, appropriate eye contact.  Attitude/Behavior: Cooperative, conversant, engaged, and with good eye contact.  Motor: No psychomotor agitation or retardation, no tremor or other abnormal movements.  Speech: Normal rate, volume, prosody  Gait/Station: Within normal limits  Mood: \"better.\"  Affect: Euthymic, full-range and Congruent with mood and topic of conversation  Thought Process: Linear, goal directed  Thought Associations: No loosening of associations  Thought Content: normal  Sensorium: Alert and oriented to person, place, time and situation  Insight: Fair, as evidenced by awareness of symptom patterns  Judgment: Fair  Cognition: Cognitively intact to conversational testing with respect to attention, orientation, fund of knowledge, recent and remote memory, and language.  Testing: N/A.    Laboratory/Imaging/Diagnostic Tests       Assessment/Plan   Overall Formulation and Differential Diagnosis:  Ellie Garcia is a 34 y.o. female who meets criteria for provisional PMDD based on retrospective data.    Interval Assessment:  Progressive worsening of premenstrual affective symptoms (including daily passive SI), physical symptoms, and functional impairment. Currently has hormonal IUD, will try premenstrual SRI dosing for symptoms. Continued baseline depression as well and will increase bupropion. Able to contract for safety.    *9/13/24: Improved premenstrual symptoms with catamenial fluoxetine. Rare breakthrough panic, will try PRN lorazepam for breakthrough panic.    -continue bupropion 300 mg PO daily  -continue fluoxetine 10 mg PO daily for 14 days prior to menses  -trial lorazepam 0.5 mg PO daily PRN panic  -continue prospective menstrual tracking  -RTC 8 weeks    Risk Assessment:  Imminent Risk of Suicide or Serious Self-Injury: Low Risk -- Risk factors include: History of self harm , History of suicide attempt , and Medical illness comorbidity  " Protective factors include:Future-oriented talk , Willingness to seek help and support , Skills in problem solving, conflict resolution, and nonviolent handling of disputes, Cultural and [employer] beliefs that discourage suicide and support self-preservation , Access to a variety of clinical interventions , Receiving and engaged in care for mental, physical, and substance use disorders , History of adhering to treatment recommendations and/or prescribed medication regimen , Support through ongoing medical and mental healthcare relationships , Interpersonal relationships and supports, e.g., family, friends, peers, community , and Restricted access to firearms or other lethal means of suicide   Imminent Risk of Violence or Homicide: Low Risk - Risk factors include: No significant risk factors identified on screening. Protective factors include: Lack of known history of harm to others , Lack of known history of violent ideation , and Lack of known access to firearms   Treatment Plan:  There are no recently modified care plans to display for this patient.      Attestation Statements   Number of Minutes Spent Performing Evaluation & Management (E&M): 30 and Topics (in addition those noted above): Diagnostic impressions, Importance of adherence to treatment , Instructions for treatment , Patient education , Risks and benefits of treatments , Risk factor reduction , and Side effects of medications

## 2024-09-26 ENCOUNTER — APPOINTMENT (OUTPATIENT)
Dept: BEHAVIORAL HEALTH | Facility: CLINIC | Age: 34
End: 2024-09-26
Payer: COMMERCIAL

## 2024-09-26 DIAGNOSIS — F41.1 GENERALIZED ANXIETY DISORDER: ICD-10-CM

## 2024-09-26 DIAGNOSIS — F33.1 MODERATE EPISODE OF RECURRENT MAJOR DEPRESSIVE DISORDER: Primary | ICD-10-CM

## 2024-09-26 PROCEDURE — 90837 PSYTX W PT 60 MINUTES: CPT | Performed by: PSYCHOLOGIST

## 2024-09-26 NOTE — PROGRESS NOTES
START TIME: 8:05am  END TIME: 9  TOTAL TIME FACE TO FACE: 55mins    Subjective   Patient ID: Ellie Garcia is a 34 y.o. female who presents for   Problem List Items Addressed This Visit       Generalized anxiety disorder    Major depressive disorder, recurrent (CMS-HCC) - Primary   .    Virtual or Telephone Consent    An interactive audio and video telecommunication system which permits real time communications between the patient (at the originating site) and provider (at the distant site) was utilized to provide this telehealth service.   Verbal consent was requested and obtained from Ellie Garcia on this date, 09/26/24 for a telehealth visit.      Pt located at home residence. Requested sensitive note.    CONTENT OF SESSION: This was a followup appointment (cognitive behavioral therapy) between provider and patient to address symptoms of postpartum anxiety, depression, and ptsd.     SYMPTOMS: fatigue, low mood, anxiety, worry thoughts.  Time was spent on review of Ellie's experience with PMDD symptoms. She reports medication has been effective, she has been able to detect onset of symptoms more and notices how impairing they had been. Provider reviewed psychoeducation on PMDD symptoms and personality traits. Provider also gave positive feedback about Ellie's skill use. Time was spent on emotional processing of Ellie's daughter's upcoming third birthday and on reminders of reproductive trauma.  Provider gave supportive counseling and normalized common emotional experiences.    Objective   Social History     Substance and Sexual Activity   Alcohol Use Yes    Comment: rare      Social History     Social History Narrative    Not on file        Assessment/Plan   Problem List Items Addressed This Visit       Generalized anxiety disorder    Major depressive disorder, recurrent (CMS-HCC) - Primary     PLAN: [place] plan made for individual CBT bimonthly to address mood and anxiety and ptsd symptoms and  anxiety in response to medical/pelvic conditions.    Ellie will continue with Dr. Audi Guo for psychiatry medication management.     PRACTICE EXERCISES PROVIDED: read handouts provided on anxiety and trauma. keep list of anxiety triggers/avoidance behaviors. read handout on ACT. followup in 2 weeks. practice cognitive defusion as discussed. practice 'cognitive restructuring' skill (with challenging questions worksheet, cognitive distortions worksheet as discussed).

## 2024-10-10 ENCOUNTER — APPOINTMENT (OUTPATIENT)
Dept: BEHAVIORAL HEALTH | Facility: CLINIC | Age: 34
End: 2024-10-10
Payer: COMMERCIAL

## 2024-10-10 DIAGNOSIS — F41.1 GENERALIZED ANXIETY DISORDER: ICD-10-CM

## 2024-10-10 DIAGNOSIS — F33.1 MODERATE EPISODE OF RECURRENT MAJOR DEPRESSIVE DISORDER: Primary | ICD-10-CM

## 2024-10-10 PROCEDURE — 90837 PSYTX W PT 60 MINUTES: CPT | Performed by: PSYCHOLOGIST

## 2024-10-10 NOTE — PROGRESS NOTES
START TIME: 8:05   END TIME: 9  TOTAL TIME FACE TO FACE: 55mins    Subjective   Patient ID: Ellie Garcia is a 34 y.o. female who presents for   Problem List Items Addressed This Visit       Generalized anxiety disorder    Major depressive disorder, recurrent - Primary   .    Virtual or Telephone Consent    An interactive audio and video telecommunication system which permits real time communications between the patient (at the originating site) and provider (at the distant site) was utilized to provide this telehealth service.   Verbal consent was requested and obtained from Ellie Garcia on this date, 10/10/24 for a telehealth visit.      Pt located at home residence. Requested sensitive note.     CONTENT OF SESSION: This was a followup appointment (cognitive behavioral therapy) between provider and patient to address symptoms of postpartum anxiety, depression, and ptsd.       SYMPTOMS: fatigue, low mood, anxiety, worry thoughts.    Time was spent on emotional processing of Ellie's experience with impact of family dynamics and managing intergenerational trauma. She described appropriate use of boundary and limit setting and values-guided action to cope with these experiences. Provider reviewed psychoeducation on relationship boundaries and gave positive feedback to Ellie about her skill use. Time was also spent on emotional processing of grief associated with relationship losses/changes and provider reviewed psychoeducation on nature and function of grief. Provider gave supportive counseling and normalized common emotional experiences.    Objective   Social History     Substance and Sexual Activity   Alcohol Use Yes    Comment: rare      Social History     Social History Narrative    Not on file        Assessment/Plan   Problem List Items Addressed This Visit       Generalized anxiety disorder    Major depressive disorder, recurrent - Primary     PLAN: [place] plan made for individual CBT bimonthly  to address mood and anxiety and ptsd symptoms and anxiety in response to medical/pelvic conditions.    Ellie will continue with Dr. Audi Guo for psychiatry medication management.     PRACTICE EXERCISES PROVIDED: read handouts provided on anxiety and trauma. keep list of anxiety triggers/avoidance behaviors. read handout on ACT. followup in 2 weeks. practice cognitive defusion as discussed. practice 'cognitive restructuring' skill (with challenging questions worksheet, cognitive distortions worksheet as discussed).

## 2024-10-25 ENCOUNTER — APPOINTMENT (OUTPATIENT)
Dept: BEHAVIORAL HEALTH | Facility: CLINIC | Age: 34
End: 2024-10-25
Payer: COMMERCIAL

## 2024-10-25 DIAGNOSIS — F41.1 GENERALIZED ANXIETY DISORDER: ICD-10-CM

## 2024-10-25 DIAGNOSIS — F33.1 MODERATE EPISODE OF RECURRENT MAJOR DEPRESSIVE DISORDER: Primary | ICD-10-CM

## 2024-10-25 NOTE — PROGRESS NOTES
START TIME: 8:05  END TIME:9  TOTAL TIME FACE TO FACE: 55min    Subjective   Patient ID: Ellie Garcia is a 34 y.o. female who presents for   Problem List Items Addressed This Visit       Generalized anxiety disorder    Major depressive disorder, recurrent - Primary   .    Virtual or Telephone Consent    An interactive audio and video telecommunication system which permits real time communications between the patient (at the originating site) and provider (at the distant site) was utilized to provide this telehealth service.   Verbal consent was requested and obtained from Ellie Garcia on this date, 10/25/24 for a telehealth visit.      Pt located at home residence. Requested sensitive note.     CONTENT OF SESSION: This was a followup appointment (cognitive behavioral therapy) between provider and patient to address symptoms of postpartum anxiety, depression, and ptsd.        SYMPTOMS: fatigue, low mood, anxiety, worry thoughts.     Ellie reported increased PMDD symptoms and feeling overwhelmed this past week. She described her use of coping strategies (e.g., breathing exercises; comforting movies; relaxation) but also noted patterns of feeling heightened distress when outside of her routine and some negative self-judgment thoughts in social situations. Provider used Socratic questioning to facilitate cognitive restructuring of potential unrealistic or unhelpful cognitions and patient was able to engage in cognitive restructuring. Time was also spent on emotional processing of interpersonal stressors, as well as on Ellie's experience with recent reminder of reproductive loss. Provider reviewed psychoeducation on impact of family dynamics and interpersonal experiences on perception of self.   Provider gave supportive counseling and normalized common emotional experiences.    Objective   Social History     Substance and Sexual Activity   Alcohol Use Yes    Comment: rare      Social History     Social  History Narrative    Not on file        Assessment/Plan   Problem List Items Addressed This Visit       Generalized anxiety disorder    Major depressive disorder, recurrent - Primary     PLAN: [place] plan made for individual CBT bimonthly to address mood and anxiety and ptsd symptoms and anxiety in response to medical/pelvic conditions.    Ellie will continue with Dr. Audi Guo for psychiatry medication management.     PRACTICE EXERCISES PROVIDED: read handouts provided on anxiety and trauma. keep list of anxiety triggers/avoidance behaviors. read handout on ACT. followup in 2 weeks. practice cognitive defusion as discussed. practice 'cognitive restructuring' skill (with challenging questions worksheet, cognitive distortions worksheet as discussed).

## 2024-11-07 ENCOUNTER — APPOINTMENT (OUTPATIENT)
Dept: PRIMARY CARE | Facility: CLINIC | Age: 34
End: 2024-11-07
Payer: COMMERCIAL

## 2024-11-07 VITALS
DIASTOLIC BLOOD PRESSURE: 70 MMHG | WEIGHT: 130 LBS | SYSTOLIC BLOOD PRESSURE: 110 MMHG | HEART RATE: 66 BPM | BODY MASS INDEX: 22.2 KG/M2 | HEIGHT: 64 IN

## 2024-11-07 DIAGNOSIS — Z00.00 HEALTHCARE MAINTENANCE: Primary | ICD-10-CM

## 2024-11-07 PROCEDURE — 3008F BODY MASS INDEX DOCD: CPT | Performed by: CLINICAL NURSE SPECIALIST

## 2024-11-07 PROCEDURE — 99395 PREV VISIT EST AGE 18-39: CPT | Performed by: CLINICAL NURSE SPECIALIST

## 2024-11-07 PROCEDURE — 1036F TOBACCO NON-USER: CPT | Performed by: CLINICAL NURSE SPECIALIST

## 2024-11-07 ASSESSMENT — ENCOUNTER SYMPTOMS
CHEST TIGHTNESS: 0
FLANK PAIN: 0
BLOOD IN STOOL: 0
WOUND: 0
SHORTNESS OF BREATH: 0
CONFUSION: 0
ABDOMINAL PAIN: 0
ARTHRALGIAS: 0
ACTIVITY CHANGE: 0
CONSTIPATION: 0
FEVER: 0
POLYDIPSIA: 0
CHILLS: 0
FATIGUE: 0
SLEEP DISTURBANCE: 0
EYE PAIN: 0
DYSURIA: 0
HEMATURIA: 0
NECK PAIN: 0
JOINT SWELLING: 0
OCCASIONAL FEELINGS OF UNSTEADINESS: 0
MYALGIAS: 0
DIZZINESS: 0
DIARRHEA: 0
UNEXPECTED WEIGHT CHANGE: 0
HEADACHES: 0
DEPRESSION: 0
SORE THROAT: 0
PALPITATIONS: 0
TROUBLE SWALLOWING: 0
VOMITING: 0
BRUISES/BLEEDS EASILY: 0
APPETITE CHANGE: 0
WHEEZING: 0
PHOTOPHOBIA: 0
LOSS OF SENSATION IN FEET: 0
NAUSEA: 0
SEIZURES: 0
COUGH: 0
BACK PAIN: 0

## 2024-11-07 ASSESSMENT — PATIENT HEALTH QUESTIONNAIRE - PHQ9
2. FEELING DOWN, DEPRESSED OR HOPELESS: NEARLY EVERY DAY
9. THOUGHTS THAT YOU WOULD BE BETTER OFF DEAD, OR OF HURTING YOURSELF: NOT AT ALL
10. IF YOU CHECKED OFF ANY PROBLEMS, HOW DIFFICULT HAVE THESE PROBLEMS MADE IT FOR YOU TO DO YOUR WORK, TAKE CARE OF THINGS AT HOME, OR GET ALONG WITH OTHER PEOPLE: SOMEWHAT DIFFICULT
SUM OF ALL RESPONSES TO PHQ QUESTIONS 1-9: 8
3. TROUBLE FALLING OR STAYING ASLEEP OR SLEEPING TOO MUCH: SEVERAL DAYS
4. FEELING TIRED OR HAVING LITTLE ENERGY: SEVERAL DAYS
5. POOR APPETITE OR OVEREATING: NOT AT ALL
8. MOVING OR SPEAKING SO SLOWLY THAT OTHER PEOPLE COULD HAVE NOTICED. OR THE OPPOSITE, BEING SO FIGETY OR RESTLESS THAT YOU HAVE BEEN MOVING AROUND A LOT MORE THAN USUAL: NOT AT ALL
SUM OF ALL RESPONSES TO PHQ9 QUESTIONS 1 AND 2: 3
6. FEELING BAD ABOUT YOURSELF - OR THAT YOU ARE A FAILURE OR HAVE LET YOURSELF OR YOUR FAMILY DOWN: NEARLY EVERY DAY
1. LITTLE INTEREST OR PLEASURE IN DOING THINGS: NOT AT ALL
7. TROUBLE CONCENTRATING ON THINGS, SUCH AS READING THE NEWSPAPER OR WATCHING TELEVISION: NOT AT ALL

## 2024-11-07 ASSESSMENT — ANXIETY QUESTIONNAIRES
6. BECOMING EASILY ANNOYED OR IRRITABLE: NOT AT ALL
5. BEING SO RESTLESS THAT IT IS HARD TO SIT STILL: SEVERAL DAYS
2. NOT BEING ABLE TO STOP OR CONTROL WORRYING: NEARLY EVERY DAY
4. TROUBLE RELAXING: NEARLY EVERY DAY
GAD7 TOTAL SCORE: 14
1. FEELING NERVOUS, ANXIOUS, OR ON EDGE: NEARLY EVERY DAY
IF YOU CHECKED OFF ANY PROBLEMS ON THIS QUESTIONNAIRE, HOW DIFFICULT HAVE THESE PROBLEMS MADE IT FOR YOU TO DO YOUR WORK, TAKE CARE OF THINGS AT HOME, OR GET ALONG WITH OTHER PEOPLE: SOMEWHAT DIFFICULT
3. WORRYING TOO MUCH ABOUT DIFFERENT THINGS: NEARLY EVERY DAY
7. FEELING AFRAID AS IF SOMETHING AWFUL MIGHT HAPPEN: SEVERAL DAYS

## 2024-11-07 ASSESSMENT — COLUMBIA-SUICIDE SEVERITY RATING SCALE - C-SSRS
1. IN THE PAST MONTH, HAVE YOU WISHED YOU WERE DEAD OR WISHED YOU COULD GO TO SLEEP AND NOT WAKE UP?: NO
2. HAVE YOU ACTUALLY HAD ANY THOUGHTS OF KILLING YOURSELF?: NO
6. HAVE YOU EVER DONE ANYTHING, STARTED TO DO ANYTHING, OR PREPARED TO DO ANYTHING TO END YOUR LIFE?: NO

## 2024-11-07 NOTE — PROGRESS NOTES
Subjective   Patient ID: Ellie Garcia is a 34 y.o. female who presents for New Patient Visit.  HPI    New patient here today to establish care.  Due for a PCP appointment. Previously had been following with Specialist to guide care.     Up to date with [facility].     Following with Psychiatry.     Review of Systems   Constitutional:  Negative for activity change, appetite change, chills, fatigue, fever and unexpected weight change.   HENT:  Negative for ear pain, hearing loss, nosebleeds, sore throat, tinnitus and trouble swallowing.    Eyes:  Negative for photophobia, pain and visual disturbance.   Respiratory:  Negative for cough, chest tightness, shortness of breath and wheezing.    Cardiovascular:  Negative for chest pain, palpitations and leg swelling.   Gastrointestinal:  Negative for abdominal pain, blood in stool, constipation, diarrhea, nausea and vomiting.   Endocrine: Negative for cold intolerance, heat intolerance, polydipsia and polyuria.   Genitourinary:  Negative for dysuria, flank pain and hematuria.   Musculoskeletal:  Negative for arthralgias, back pain, joint swelling, myalgias and neck pain.   Skin:  Negative for pallor, rash and wound.   Allergic/Immunologic: Negative for immunocompromised state.   Neurological:  Negative for dizziness, seizures and headaches.   Hematological:  Does not bruise/bleed easily.   Psychiatric/Behavioral:  Negative for confusion and sleep disturbance.        Objective   Physical Exam  Vitals and nursing note reviewed.   Constitutional:       General: She is not in acute distress.     Appearance: Normal appearance.   HENT:      Head: Normocephalic.      Nose: Nose normal.   Eyes:      Conjunctiva/sclera: Conjunctivae normal.   Neck:      Vascular: No carotid bruit.   Cardiovascular:      Rate and Rhythm: Normal rate and regular rhythm.      Pulses: Normal pulses.      Heart sounds: Normal heart sounds.   Pulmonary:      Effort: Pulmonary effort is  normal.      Breath sounds: Normal breath sounds.   Abdominal:      General: Bowel sounds are normal.      Palpations: Abdomen is soft.   Musculoskeletal:         General: Normal range of motion.      Cervical back: Normal range of motion.   Skin:     General: Skin is warm and dry.   Neurological:      Mental Status: She is alert and oriented to person, place, and time. Mental status is at baseline.   Psychiatric:         Mood and Affect: Mood normal.         Behavior: Behavior normal.         Assessment/Plan       New order for blood work provided at [facility] today.      Wellness: Routine and age appropriate recommendations discussed with the patient today and patient verbalized understanding of the recommendations.  Questions answered.  Age appropriate immunizations and preventative screenings discussed with the patient and ordered as appropriate. Labs updated and ordered as indicated. Recommend healthy diet and daily exercise to maintain healthy body weight.     Wellness: November 2024.     PARAM Jones-CNS 11/07/24 1:12 PM

## 2024-11-08 ENCOUNTER — APPOINTMENT (OUTPATIENT)
Dept: BEHAVIORAL HEALTH | Facility: CLINIC | Age: 34
End: 2024-11-08
Payer: COMMERCIAL

## 2024-11-13 ENCOUNTER — LAB (OUTPATIENT)
Dept: LAB | Facility: LAB | Age: 34
End: 2024-11-13
Payer: COMMERCIAL

## 2024-11-13 DIAGNOSIS — Z00.00 HEALTHCARE MAINTENANCE: ICD-10-CM

## 2024-11-13 LAB
ALBUMIN SERPL BCP-MCNC: 4.4 G/DL (ref 3.4–5)
ALP SERPL-CCNC: 58 U/L (ref 33–110)
ALT SERPL W P-5'-P-CCNC: 18 U/L (ref 7–45)
ANION GAP SERPL CALC-SCNC: 11 MMOL/L (ref 10–20)
AST SERPL W P-5'-P-CCNC: 15 U/L (ref 9–39)
BILIRUB SERPL-MCNC: 0.5 MG/DL (ref 0–1.2)
BUN SERPL-MCNC: 13 MG/DL (ref 6–23)
CALCIUM SERPL-MCNC: 9.7 MG/DL (ref 8.6–10.6)
CHLORIDE SERPL-SCNC: 102 MMOL/L (ref 98–107)
CHOLEST SERPL-MCNC: 179 MG/DL (ref 0–199)
CHOLESTEROL/HDL RATIO: 2.8
CO2 SERPL-SCNC: 31 MMOL/L (ref 21–32)
CREAT SERPL-MCNC: 0.74 MG/DL (ref 0.5–1.05)
EGFRCR SERPLBLD CKD-EPI 2021: >90 ML/MIN/1.73M*2
ERYTHROCYTE [DISTWIDTH] IN BLOOD BY AUTOMATED COUNT: 12.4 % (ref 11.5–14.5)
GLUCOSE SERPL-MCNC: 91 MG/DL (ref 74–99)
HCT VFR BLD AUTO: 45.2 % (ref 36–46)
HCV AB SER QL: NONREACTIVE
HDLC SERPL-MCNC: 64.2 MG/DL
HGB BLD-MCNC: 15.2 G/DL (ref 12–16)
LDLC SERPL CALC-MCNC: 105 MG/DL
MCH RBC QN AUTO: 29.6 PG (ref 26–34)
MCHC RBC AUTO-ENTMCNC: 33.6 G/DL (ref 32–36)
MCV RBC AUTO: 88 FL (ref 80–100)
NON HDL CHOLESTEROL: 115 MG/DL (ref 0–149)
NRBC BLD-RTO: 0 /100 WBCS (ref 0–0)
PLATELET # BLD AUTO: 301 X10*3/UL (ref 150–450)
POTASSIUM SERPL-SCNC: 4.7 MMOL/L (ref 3.5–5.3)
PROT SERPL-MCNC: 6.8 G/DL (ref 6.4–8.2)
RBC # BLD AUTO: 5.14 X10*6/UL (ref 4–5.2)
SODIUM SERPL-SCNC: 139 MMOL/L (ref 136–145)
TRIGL SERPL-MCNC: 50 MG/DL (ref 0–149)
TSH SERPL-ACNC: 0.92 MIU/L (ref 0.44–3.98)
VIT B12 SERPL-MCNC: 434 PG/ML (ref 211–911)
VLDL: 10 MG/DL (ref 0–40)
WBC # BLD AUTO: 7.5 X10*3/UL (ref 4.4–11.3)

## 2024-11-13 PROCEDURE — 80053 COMPREHEN METABOLIC PANEL: CPT

## 2024-11-13 PROCEDURE — 80061 LIPID PANEL: CPT

## 2024-11-13 PROCEDURE — 84443 ASSAY THYROID STIM HORMONE: CPT

## 2024-11-13 PROCEDURE — 36415 COLL VENOUS BLD VENIPUNCTURE: CPT

## 2024-11-13 PROCEDURE — 85027 COMPLETE CBC AUTOMATED: CPT

## 2024-11-13 PROCEDURE — 82607 VITAMIN B-12: CPT

## 2024-11-13 PROCEDURE — 86803 HEPATITIS C AB TEST: CPT

## 2024-11-14 ENCOUNTER — APPOINTMENT (OUTPATIENT)
Dept: BEHAVIORAL HEALTH | Facility: CLINIC | Age: 34
End: 2024-11-14
Payer: COMMERCIAL

## 2024-11-14 DIAGNOSIS — F41.1 GENERALIZED ANXIETY DISORDER: Primary | ICD-10-CM

## 2024-11-14 DIAGNOSIS — F33.1 MODERATE EPISODE OF RECURRENT MAJOR DEPRESSIVE DISORDER: ICD-10-CM

## 2024-11-14 PROCEDURE — 90837 PSYTX W PT 60 MINUTES: CPT | Performed by: PSYCHOLOGIST

## 2024-11-14 NOTE — PROGRESS NOTES
START TIME: 8:05   END TIME: 9  TOTAL TIME FACE TO FACE: 55mins    Subjective   Patient ID: Ellie Garica is a 34 y.o. female who presents for   Problem List Items Addressed This Visit       Generalized anxiety disorder - Primary    Major depressive disorder, recurrent   .    Virtual or Telephone Consent    An interactive audio and video telecommunication system which permits real time communications between the patient (at the originating site) and provider (at the distant site) was utilized to provide this telehealth service.   Verbal consent was requested and obtained from Ellie Garcia on this date, 11/14/24 for a telehealth visit.      Pt located at home residence. Requested sensitive note.     CONTENT OF SESSION: This was a followup appointment (cognitive behavioral therapy) between provider and patient to address symptoms of postpartum anxiety, depression, and ptsd.        SYMPTOMS: fatigue, low mood, anxiety, worry thoughts.     Ellie reported some lower mood in response to feeling emotionally hurt by a family member's behavior that led to her feeling trust was violated. Time was spent on emotional processing of this experience and Ellie's experience in that relationship and in other relationships where she finds herself a caregiver. Ellie expressed appropriate emotional reactions and values-guided action. Provider reviewed psychoeducation on assertive communication and limit-setting and encouraged self-care.  Provider gave supportive counseling and normalized common emotional experiences.    Objective   Social History     Substance and Sexual Activity   Alcohol Use Yes    Comment: rare      Social History     Social History Narrative    Not on file        Assessment/Plan   Problem List Items Addressed This Visit       Generalized anxiety disorder - Primary    Major depressive disorder, recurrent     PLAN: [place] plan made for individual CBT bimonthly to address mood and anxiety and ptsd  symptoms and anxiety in response to medical/pelvic conditions.    Ellie will continue with Dr. Audi Guo for psychiatry medication management.     PRACTICE EXERCISES PROVIDED: read handouts provided on anxiety and trauma. keep list of anxiety triggers/avoidance behaviors. read handout on ACT. followup in 2 weeks. practice cognitive defusion as discussed. practice 'cognitive restructuring' skill (with challenging questions worksheet, cognitive distortions worksheet as discussed).

## 2024-11-20 ENCOUNTER — TELEPHONE (OUTPATIENT)
Dept: PRIMARY CARE | Facility: CLINIC | Age: 34
End: 2024-11-20
Payer: COMMERCIAL

## 2024-11-20 DIAGNOSIS — Z00.00 HEALTHCARE MAINTENANCE: Primary | ICD-10-CM

## 2024-11-20 NOTE — TELEPHONE ENCOUNTER
----- Message from Ingrid Schaeffer sent at 11/19/2024  9:03 PM EST -----  Updated patient via portal regarding results. Please reorder lab work prior to follow up appointment. Thank you!

## 2024-11-29 ENCOUNTER — APPOINTMENT (OUTPATIENT)
Dept: BEHAVIORAL HEALTH | Facility: CLINIC | Age: 34
End: 2024-11-29
Payer: COMMERCIAL

## 2024-12-12 ENCOUNTER — APPOINTMENT (OUTPATIENT)
Dept: BEHAVIORAL HEALTH | Facility: CLINIC | Age: 34
End: 2024-12-12
Payer: COMMERCIAL

## 2024-12-12 DIAGNOSIS — F41.1 GENERALIZED ANXIETY DISORDER: ICD-10-CM

## 2024-12-12 DIAGNOSIS — F33.1 MODERATE EPISODE OF RECURRENT MAJOR DEPRESSIVE DISORDER: Primary | ICD-10-CM

## 2024-12-12 PROCEDURE — 90837 PSYTX W PT 60 MINUTES: CPT | Performed by: PSYCHOLOGIST

## 2024-12-12 NOTE — PROGRESS NOTES
"START TIME:   END TIME:  TOTAL TIME FACE TO FACE:    Subjective   Patient ID: Ellie Garcia is a 34 y.o. female who presents for   Problem List Items Addressed This Visit       Generalized anxiety disorder    Major depressive disorder, recurrent - Primary   .    Virtual or Telephone Consent    An interactive audio and video telecommunication system which permits real time communications between the patient (at the originating site) and provider (at the distant site) was utilized to provide this telehealth service.   Verbal consent was requested and obtained from Ellie Garcia on this date, 12/12/24 for a telehealth visit.      Pt located at home residence. Requested sensitive note.     CONTENT OF SESSION: This was a followup appointment (cognitive behavioral therapy) between provider and patient to address symptoms of postpartum anxiety, depression, and ptsd.        SYMPTOMS: fatigue, low mood, anxiety, worry thoughts.     Ellie  was relatively euthymic during session- affect was appropriate to content. She reported some anxious distress/low mood in response to seasonal stress and family/interpersonal dynamics. She identified how others often misperceive her as \"cold\" or  her negatively; she has been implementing more interpersonal boundaries this year as a result and is finding this helpful. Provider gave positive feedback about this skill use. Time was also spent on emotional processing of Ellie's experience with past negative interpersonal experiences (e.g., parents' divorce). Provider gave supportive counseling and normalized common emotional experiences.    Objective   Social History     Substance and Sexual Activity   Alcohol Use Yes    Comment: rare      Social History     Social History Narrative    Not on file        Assessment/Plan   Problem List Items Addressed This Visit       Generalized anxiety disorder    Major depressive disorder, recurrent - Primary     PLAN: [place] plan made " for individual CBT bimonthly to address mood and anxiety and ptsd symptoms and anxiety in response to medical/pelvic conditions.    Ellie will continue with Dr. Audi Guo for psychiatry medication management.     PRACTICE EXERCISES PROVIDED: read handouts provided on anxiety and trauma. keep list of anxiety triggers/avoidance behaviors. read handout on ACT. followup in 2 weeks. practice cognitive defusion as discussed. practice 'cognitive restructuring' skill (with challenging questions worksheet, cognitive distortions worksheet as discussed).

## 2024-12-26 ENCOUNTER — APPOINTMENT (OUTPATIENT)
Dept: BEHAVIORAL HEALTH | Facility: CLINIC | Age: 34
End: 2024-12-26
Payer: COMMERCIAL

## 2025-01-10 ENCOUNTER — APPOINTMENT (OUTPATIENT)
Dept: BEHAVIORAL HEALTH | Facility: CLINIC | Age: 35
End: 2025-01-10
Payer: COMMERCIAL

## 2025-01-10 DIAGNOSIS — F33.1 MODERATE EPISODE OF RECURRENT MAJOR DEPRESSIVE DISORDER: ICD-10-CM

## 2025-01-10 DIAGNOSIS — F32.81 PREMENSTRUAL DYSPHORIA: ICD-10-CM

## 2025-01-10 PROCEDURE — 99214 OFFICE O/P EST MOD 30 MIN: CPT | Performed by: STUDENT IN AN ORGANIZED HEALTH CARE EDUCATION/TRAINING PROGRAM

## 2025-01-10 RX ORDER — FLUOXETINE 10 MG/1
10 CAPSULE ORAL DAILY
Qty: 90 CAPSULE | Refills: 3 | Status: SHIPPED | OUTPATIENT
Start: 2025-01-10 | End: 2026-01-10

## 2025-01-10 NOTE — PROGRESS NOTES
Subjective    All Individuals Present: Patient and Provider (Encounter Provider)     ID: Ellie Garcia is a 34 y.o. female presenting for assessment.     Interval History/HPI/PFSH:  Fluoxetine continues to work well, still tricky if some symptoms start earlier than normal and has to play catch-up with fluoxetine. Able to quickly identify if this happens.    Work schedule picking up more, working 2 jobs, doing more of her [employer] job because 's job reduced overtime.    Has not had to use lorazepam, almost did once but got through the incident.    No SI, HI, AVH.    *Has had 3 cycles since last appt. Some difficulty planning exactly what day to start fluoxetine. The first cycle, noticed immediate benefit from fluoxetine, second cycle was more intense and fluoxetine provided some respite but not full resolution. Did not notice any blunting for those two weeks.    Only has panic attacks 2-3x/year. Will feel tightness moving from stomach to sternum before knows it is moving into panic.    Looking forward to Halloween.    No SI, HI, AVH.    *Has been seeing Dr. Sommers in context of PPD (3 yo daughter now, Rose Marie).    Now coming d/t worsening passive SI, becoming almost daily, especially exacerbated since last fall. Will get feelings of worthlessness and as if everyone would be better without her.    Had not been on medication for a while, just started again last spring, bupropion does help reduce anxious thoughts. Most med trials in the past caused affective blunting or weight gain. Prior dx of bipolar II based on working out, decreased need for sleep, feeling ramped up.    When got IUD, things got better with pains, but mood instability remained despite not bleeding. Currently has Mirena. Trying to workout to behaviorally manage, but that was hard PP because of perineal complications.    Last symptom period (no bleeding).    Denies HI, AVH.    PMDD screen:     Menses:  First period: 15 yo  Cycle: (x) regular ()  irregular   Cycle Length: 30 days, 4-7 days of bleeding; gets mood symptoms 10-14 days prior  Flow: heavy  Cramping/pain: painful  D/t pain and heavy flow, was placed on combo BC pill, helped with physical symptoms; first combo pill seemed to help mentally as well.     Menstrual Related Symptoms: () prospective report (x) retrospective report      Affective  ( x ) marked affective lability  ( x ) marked irritability or anger; increased interpersonal conflicts  ( x ) marked depressed mood, feelings of hopeless or self-deprecating thoughts  ( x ) maked tension, anxiety, feeling keyed up or on edge      Behavioral/Physical   (  ) decreased interest in usual activities  ( x ) difficulties with concentration  ( x ) lethargy, fatigue, lack of energy   (  ) changes in appetite  ( x ) hypersomnia or insomnia  ( x ) feeling out of control or overwhelmed  ( x ) physical symptoms: breast tenderness/swelling, bloating, weight gain, joint/muscle pain, acne       Medication side effects: None Reported     Review of Systems  Constitutional: Positive for fatigue, Negative for fevers  Psychiatric: Positive for anxiety, depression, fatigue, irritability, and mood swings, Negative for learning difficulty and sleep disturbance  Neurological: Negative   Other: irregular menses    Current Outpatient Medications on File Prior to Visit   Medication Sig Dispense Refill    buPROPion XL (Wellbutrin XL) 300 mg 24 hr tablet Take 1 tablet (300 mg) by mouth once daily. Do not crush, chew, or split. 30 tablet 11    colestipol (Colestid) 1 gram tablet Take by mouth. Take 1 pill every other day, if not improvement after two weeks, take one daily, if no improvement after that, thke 2 pills daily      diazePAM (Valium) 2 mg tablet 1 tablet (2 mg). Place 1 tablet in vagina as needed before intercourse or pelvic floor therapy up to every 8 hours      FLUoxetine (PROzac) 10 mg capsule Take 1 capsule PO daily for 14 days prior to menses 14 capsule 11     levonorgestrel (Mirena) 21 mcg/24 hours (8 yrs) 52 mg IUD by intrauterine route. Inserted November 11,2021      LORazepam (Ativan) 0.5 mg tablet Take 1 tablet (0.5 mg) by mouth once daily as needed for anxiety. 10 tablet 0    psyllium (Metamucil, with sugar,) 3.4 gram packet Take by mouth. use 1 packet daily, if still having loose stool after 1 week, go up to 2 packets daily       No current facility-administered medications on file prior to visit.        Past Psychiatric Hx:  Dx: started seeing mental health providers in teenage years, ~14-15 yo starting meds; has been treated for depression and OCD (constant scrubbing/bathing), dx with bipolar II  -No hospitalizations  -SA at 14 yo (hanging), a few more in high school (hanging, potential attempts at overdose); also SIB by cutting in HS  Rx Trials: citalopram (blunting), trazodone (stopped with pregnancy, v. sedating), sertraline (weight gain), escitalopram, carbamazepine (blunting), lurasidone (weight gain)  Has never been on fluoxetine, lamotrigine    Patient Active Problem List   Diagnosis    Abdominal pain of multiple sites    Chronic sinusitis    Foot pain    Irritable bowel syndrome    Stool incontinence    Stress incontinence of urine    Abdominal pain, bilateral lower quadrant    Deep dyspareunia in female    Generalized anxiety disorder    History of food allergy    Major depressive disorder, recurrent    Muscle spasm    Pelvic floor dysfunction in female    Pelvic pain in female    Vaginal discharge    Well woman exam with routine gynecological exam    Uterovaginal prolapse    IUD (intrauterine device) in place     Past Surgical History:   Procedure Laterality Date    OTHER SURGICAL HISTORY  07/30/2014    ENT Surgical Result - Throat    OTHER SURGICAL HISTORY  05/06/2020    Dilation and curettage    VAGINOPLASTY       Allergies   Allergen Reactions    Codeine Hives and Itching     Family History   Problem Relation Name Age of Onset    Meniere's disease  "Mother      Other (CVA) Father      Diabetes type II Father's Sister      Diabetes type II Paternal Grandfather      Crohn's disease Other          Maternal Aunt, Cousin     -paternal aunt with some sort of psychosis/?cluster A?  -another aunt maybe with bipolar  -cousin with addiction    Objective   There were no vitals taken for this visit.  Wt Readings from Last 4 Encounters:   11/07/24 59 kg (130 lb)   06/27/24 62.6 kg (138 lb)   08/10/23 65.3 kg (144 lb)   07/06/23 63.5 kg (140 lb)       Mental Status Exam  General Appearance: Well groomed, appropriate eye contact.  Attitude/Behavior: Cooperative, conversant, engaged, and with good eye contact.  Motor: No psychomotor agitation or retardation, no tremor or other abnormal movements.  Speech: Normal rate, volume, prosody  Gait/Station: Within normal limits  Mood: \"good\"  Affect: Euthymic, full-range and Congruent with mood and topic of conversation  Thought Process: Linear, goal directed  Thought Associations: No loosening of associations  Thought Content: normal  Sensorium: Alert and oriented to person, place, time and situation  Insight: Fair, as evidenced by awareness of symptom patterns  Judgment: Fair  Cognition: Cognitively intact to conversational testing with respect to attention, orientation, fund of knowledge, recent and remote memory, and language.  Testing: N/A.    Laboratory/Imaging/Diagnostic Tests       Assessment/Plan   Overall Formulation and Differential Diagnosis:  Ellie Garcia is a 34 y.o. female who meets criteria for provisional PMDD based on retrospective data.    Interval Assessment:  Progressive worsening of premenstrual affective symptoms (including daily passive SI), physical symptoms, and functional impairment. Currently has hormonal IUD, will try premenstrual SRI dosing for symptoms. Continued baseline depression as well and will increase bupropion. Able to contract for safety.    *1/10/25: Will increase fluoxetine to continuous " dosing.    -continue bupropion 300 mg PO daily  -change  fluoxetine to 10 mg PO daily continuous dosing (instead of catamenial)  -trial lorazepam 0.5 mg PO daily PRN panic  -continue prospective menstrual tracking  -RTC 8 weeks    Risk Assessment:  Imminent Risk of Suicide or Serious Self-Injury: Low Risk -- Risk factors include: History of self harm , History of suicide attempt , and Medical illness comorbidity  Protective factors include:Future-oriented talk , Willingness to seek help and support , Skills in problem solving, conflict resolution, and nonviolent handling of disputes, Cultural and Nondenominational beliefs that discourage suicide and support self-preservation , Access to a variety of clinical interventions , Receiving and engaged in care for mental, physical, and substance use disorders , History of adhering to treatment recommendations and/or prescribed medication regimen , Support through ongoing medical and mental healthcare relationships , Interpersonal relationships and supports, e.g., family, friends, peers, community , and Restricted access to firearms or other lethal means of suicide   Imminent Risk of Violence or Homicide: Low Risk - Risk factors include: No significant risk factors identified on screening. Protective factors include: Lack of known history of harm to others , Lack of known history of violent ideation , and Lack of known access to firearms   Treatment Plan:  There are no recently modified care plans to display for this patient.      Attestation Statements   Number of Minutes Spent Performing Evaluation & Management (E&M): 30 and Topics (in addition those noted above): Diagnostic impressions, Importance of adherence to treatment , Instructions for treatment , Patient education , Risks and benefits of treatments , Risk factor reduction , and Side effects of medications

## 2025-02-20 ENCOUNTER — TELEMEDICINE (OUTPATIENT)
Dept: BEHAVIORAL HEALTH | Facility: CLINIC | Age: 35
End: 2025-02-20
Payer: COMMERCIAL

## 2025-02-20 DIAGNOSIS — F33.1 MODERATE EPISODE OF RECURRENT MAJOR DEPRESSIVE DISORDER: Primary | ICD-10-CM

## 2025-02-20 DIAGNOSIS — F41.1 GENERALIZED ANXIETY DISORDER: ICD-10-CM

## 2025-02-20 PROCEDURE — 90837 PSYTX W PT 60 MINUTES: CPT | Performed by: PSYCHOLOGIST

## 2025-02-20 NOTE — PROGRESS NOTES
START TIME: 10:05   END TIME: 11  TOTAL TIME FACE TO FACE: 55mins    Subjective   Patient ID: Ellie Garcia is a 34 y.o. female who presents for   Problem List Items Addressed This Visit       Generalized anxiety disorder    Major depressive disorder, recurrent - Primary   .    Virtual or Telephone Consent    An interactive audio and video telecommunication system which permits real time communications between the patient (at the originating site) and provider (at the distant site) was utilized to provide this telehealth service.   Verbal consent was requested and obtained from Ellie Garcia on this date, 02/20/25 for a telehealth visit.      Pt located at home residence. Requested sensitive note.     CONTENT OF SESSION: This was a followup appointment (cognitive behavioral therapy) between provider and patient to address symptoms of postpartum anxiety, depression, and ptsd.        SYMPTOMS: fatigue, low mood, anxiety, worry thoughts.     Ellie  was relatively dysthymic at times during session- affect was appropriate to content. She described psychological distress (sadness, frustration, feeling hurt and sense of lack of control) in response to her 's recent decision making about work and his requesting she limit communication with a friend. Time was spent on emotional processing of these experiences. Ellie noted she has also been working more, but appreciates spending time with her daughter. Provider reviewed psychoeducation on healthy relationship boundaries and assertive communication (DEAR MAN model from DBT skills). Time was spent on role play of assertive communication. Provider gave supportive counseling and normalized common emotional experiences.    Objective   Social History     Substance and Sexual Activity   Alcohol Use Yes    Comment: rare      Social History     Social History Narrative    Not on file        Assessment/Plan   Problem List Items Addressed This Visit        Generalized anxiety disorder    Major depressive disorder, recurrent - Primary     PLAN: [place] plan made for individual CBT bimonthly to address mood and anxiety and ptsd symptoms and anxiety in response to medical/pelvic conditions.    Ellie will continue with Dr. Audi Guo for psychiatry medication management.     PRACTICE EXERCISES PROVIDED: read handouts provided on anxiety and trauma. keep list of anxiety triggers/avoidance behaviors. read handout on ACT. followup in 2 weeks. practice cognitive defusion as discussed. practice 'cognitive restructuring' skill (with challenging questions worksheet, cognitive distortions worksheet as discussed).

## 2025-04-07 ENCOUNTER — APPOINTMENT (OUTPATIENT)
Dept: BEHAVIORAL HEALTH | Facility: CLINIC | Age: 35
End: 2025-04-07
Payer: COMMERCIAL

## 2025-04-10 ENCOUNTER — APPOINTMENT (OUTPATIENT)
Dept: BEHAVIORAL HEALTH | Facility: CLINIC | Age: 35
End: 2025-04-10
Payer: COMMERCIAL

## 2025-04-10 DIAGNOSIS — F33.1 MODERATE EPISODE OF RECURRENT MAJOR DEPRESSIVE DISORDER: ICD-10-CM

## 2025-04-10 DIAGNOSIS — F32.81 PREMENSTRUAL DYSPHORIA: ICD-10-CM

## 2025-04-10 PROCEDURE — 99214 OFFICE O/P EST MOD 30 MIN: CPT | Performed by: STUDENT IN AN ORGANIZED HEALTH CARE EDUCATION/TRAINING PROGRAM

## 2025-04-10 RX ORDER — LORAZEPAM 0.5 MG/1
0.5 TABLET ORAL DAILY PRN
Qty: 10 TABLET | Refills: 2 | Status: SHIPPED | OUTPATIENT
Start: 2025-04-10 | End: 2025-05-10

## 2025-04-10 NOTE — PROGRESS NOTES
Subjective    All Individuals Present: Patient and Provider (Encounter Provider)     ID: Ellie Garcia is a 34 y.o. female presenting for assessment.     Interval History/HPI/PFSH:    Things have been stressful, right now is leading up to her period (sx started 4/5). Is able to engage in behavioral skills that help mitigate symptoms. Family able to notice benefits.    Has not had any adverse effects from continuous dosing of fluoxetine. Feels like she has a better attitude overall/in-general, easier to control her affect.    Had to use lorazepam last in February for a few days with spike in situational stress on top of premenstrual symptoms.     No SI, HI, AVH.      PMDD screen:     Menses:  First period: 13 yo  Cycle: (x) regular () irregular   Cycle Length: 30 days, 4-7 days of bleeding; gets mood symptoms 10-14 days prior  Flow: heavy  Cramping/pain: painful  D/t pain and heavy flow, was placed on combo BC pill, helped with physical symptoms; first combo pill seemed to help mentally as well.     Menstrual Related Symptoms: () prospective report (x) retrospective report      Affective  ( x ) marked affective lability  ( x ) marked irritability or anger; increased interpersonal conflicts  ( x ) marked depressed mood, feelings of hopeless or self-deprecating thoughts  ( x ) maked tension, anxiety, feeling keyed up or on edge      Behavioral/Physical   (  ) decreased interest in usual activities  ( x ) difficulties with concentration  ( x ) lethargy, fatigue, lack of energy   (  ) changes in appetite  ( x ) hypersomnia or insomnia  ( x ) feeling out of control or overwhelmed  ( x ) physical symptoms: breast tenderness/swelling, bloating, weight gain, joint/muscle pain, acne       Medication side effects: None Reported     Review of Systems  Constitutional: Positive for fatigue, Negative for fevers  Psychiatric: Positive for anxiety, depression, fatigue, irritability, and mood swings, Negative for learning  difficulty and sleep disturbance  Neurological: Negative   Other: persistent cough since 10/2024    Current Outpatient Medications on File Prior to Visit   Medication Sig Dispense Refill    buPROPion XL (Wellbutrin XL) 300 mg 24 hr tablet Take 1 tablet (300 mg) by mouth once daily. Do not crush, chew, or split. 30 tablet 11    colestipol (Colestid) 1 gram tablet Take by mouth. Take 1 pill every other day, if not improvement after two weeks, take one daily, if no improvement after that, thke 2 pills daily      diazePAM (Valium) 2 mg tablet 1 tablet (2 mg). Place 1 tablet in vagina as needed before intercourse or pelvic floor therapy up to every 8 hours      FLUoxetine (PROzac) 10 mg capsule Take 1 capsule (10 mg) by mouth once daily. Dose increase to daily 90 capsule 3    levonorgestrel (Mirena) 21 mcg/24 hours (8 yrs) 52 mg IUD by intrauterine route. Inserted November 11,2021      LORazepam (Ativan) 0.5 mg tablet Take 1 tablet (0.5 mg) by mouth once daily as needed for anxiety. 10 tablet 0    psyllium (Metamucil, with sugar,) 3.4 gram packet Take by mouth. use 1 packet daily, if still having loose stool after 1 week, go up to 2 packets daily       No current facility-administered medications on file prior to visit.        Past Psychiatric Hx:  Dx: started seeing mental health providers in teenage years, ~14-15 yo starting meds; has been treated for depression and OCD (constant scrubbing/bathing), dx with bipolar II  -No hospitalizations  -SA at 14 yo (hanging), a few more in high school (hanging, potential attempts at overdose); also SIB by cutting in HS  Rx Trials: citalopram (blunting), trazodone (stopped with pregnancy, v. sedating), sertraline (weight gain), escitalopram, carbamazepine (blunting), lurasidone (weight gain)  Has never been on fluoxetine, lamotrigine    Patient Active Problem List   Diagnosis    Abdominal pain of multiple sites    Chronic sinusitis    Foot pain    Irritable bowel syndrome    Stool  "incontinence    Stress incontinence of urine    Abdominal pain, bilateral lower quadrant    Deep dyspareunia in female    Generalized anxiety disorder    History of food allergy    Major depressive disorder, recurrent    Muscle spasm    Pelvic floor dysfunction in female    Pelvic pain in female    Vaginal discharge    Well woman exam with routine gynecological exam    Uterovaginal prolapse    IUD (intrauterine device) in place     Past Surgical History:   Procedure Laterality Date    OTHER SURGICAL HISTORY  07/30/2014    ENT Surgical Result - Throat    OTHER SURGICAL HISTORY  05/06/2020    Dilation and curettage    VAGINOPLASTY       Allergies   Allergen Reactions    Codeine Hives and Itching     Family History   Problem Relation Name Age of Onset    Meniere's disease Mother      Other (CVA) Father      Diabetes type II Father's Sister      Diabetes type II Paternal Grandfather      Crohn's disease Other          Maternal Aunt, Cousin     -paternal aunt with some sort of psychosis/?cluster A?  -another aunt maybe with bipolar  -cousin with addiction    Objective   There were no vitals taken for this visit.  Wt Readings from Last 4 Encounters:   11/07/24 59 kg (130 lb)   06/27/24 62.6 kg (138 lb)   08/10/23 65.3 kg (144 lb)   07/06/23 63.5 kg (140 lb)       Mental Status Exam  General Appearance: Well groomed, appropriate eye contact.  Attitude/Behavior: Cooperative, conversant, engaged, and with good eye contact.  Motor: No psychomotor agitation or retardation, no tremor or other abnormal movements.  Speech: Normal rate, volume, prosody  Gait/Station: Within normal limits  Mood: \"good\"  Affect: Euthymic, full-range and Congruent with mood and topic of conversation  Thought Process: Linear, goal directed  Thought Associations: No loosening of associations  Thought Content: normal  Sensorium: Alert and oriented to person, place, time and situation  Insight: Fair, as evidenced by awareness of symptom " patterns  Judgment: Fair  Cognition: Cognitively intact to conversational testing with respect to attention, orientation, fund of knowledge, recent and remote memory, and language.  Testing: N/A.    Laboratory/Imaging/Diagnostic Tests       Assessment/Plan   Overall Formulation and Differential Diagnosis:  Ellie Garcia is a 34 y.o. female who meets criteria for provisional PMDD based on retrospective data.    Interval Assessment:  Progressive worsening of premenstrual affective symptoms (including daily passive SI), physical symptoms, and functional impairment. Currently has hormonal IUD, will try premenstrual SRI dosing for symptoms. Continued baseline depression as well and will increase bupropion. Able to contract for safety.    *4/10/25: Stable, no need for changes.    -continue bupropion 300 mg PO daily  -continue  fluoxetine to 10 mg PO daily continuous dosing (instead of catamenial)  -trial lorazepam 0.5 mg PO daily PRN panic  -continue prospective menstrual tracking  -RTC 12 weeks    Risk Assessment:  Imminent Risk of Suicide or Serious Self-Injury: Low Risk -- Risk factors include: History of self harm , History of suicide attempt , and Medical illness comorbidity  Protective factors include:Future-oriented talk , Willingness to seek help and support , Skills in problem solving, conflict resolution, and nonviolent handling of disputes, Cultural and Caodaism beliefs that discourage suicide and support self-preservation , Access to a variety of clinical interventions , Receiving and engaged in care for mental, physical, and substance use disorders , History of adhering to treatment recommendations and/or prescribed medication regimen , Support through ongoing medical and mental healthcare relationships , Interpersonal relationships and supports, e.g., family, friends, peers, community , and Restricted access to firearms or other lethal means of suicide   Imminent Risk of Violence or Homicide: Low Risk  - Risk factors include: No significant risk factors identified on screening. Protective factors include: Lack of known history of harm to others , Lack of known history of violent ideation , and Lack of known access to firearms   Treatment Plan:  There are no recently modified care plans to display for this patient.      Attestation Statements   Number of Minutes Spent Performing Evaluation & Management (E&M): 30 and Topics (in addition those noted above): Diagnostic impressions, Importance of adherence to treatment , Instructions for treatment , Patient education , Risks and benefits of treatments , Risk factor reduction , and Side effects of medications

## 2025-04-17 ENCOUNTER — APPOINTMENT (OUTPATIENT)
Dept: BEHAVIORAL HEALTH | Facility: CLINIC | Age: 35
End: 2025-04-17
Payer: COMMERCIAL

## 2025-04-17 DIAGNOSIS — F41.1 GENERALIZED ANXIETY DISORDER: ICD-10-CM

## 2025-04-17 DIAGNOSIS — F33.1 MODERATE EPISODE OF RECURRENT MAJOR DEPRESSIVE DISORDER: Primary | ICD-10-CM

## 2025-04-17 PROCEDURE — 90837 PSYTX W PT 60 MINUTES: CPT | Performed by: PSYCHOLOGIST

## 2025-04-18 NOTE — PROGRESS NOTES
"START TIME: 10:05   END TIME: 11  TOTAL TIME FACE TO FACE: 55mins    Subjective   Patient ID: Ellie Garcia is a 34 y.o. female who presents for   Problem List Items Addressed This Visit       Generalized anxiety disorder    Major depressive disorder, recurrent - Primary   .    Virtual or Telephone Consent    An interactive audio and video telecommunication system which permits real time communications between the patient (at the originating site) and provider (at the distant site) was utilized to provide this telehealth service.   Verbal consent was requested and obtained from Ellie Garcia on this date, 4/17/25 for a telehealth visit and the patient's location was confirmed at the time of the visit.     Pt located at home residence. Requested sensitive note.     CONTENT OF SESSION: This was a followup appointment (cognitive behavioral therapy) between provider and patient to address symptoms of postpartum anxiety, depression, and ptsd.        SYMPTOMS: fatigue, low mood, anxiety, difficulty concentration; worry thoughts.     Ellie was relatively dysthymic at times during session- affect was appropriate to content. She described psychological distress (sadness, frustration, feeling hurt and sense of lack of control) in response to relationship distress and a disruption in feeling of trust in her relationship. She noted feeling more on edge and having some worry thoughts (e.g., \"what if\" catastrophizing thoughts). Provider reviewed psychoeducation on role of cognition in anxiety and relationship between cognition and behavior. Provider used Socratic questioning to facilitate cognitive restructuring of potential unrealistic or unhelpful cognitions and patient was able to engage in cognitive restructuring. Time was spent reviewing worry time and cognitive restructuring tools.  Provider gave supportive counseling and normalized common emotional experiences.       Objective   Social History     Substance " and Sexual Activity   Alcohol Use Yes    Comment: rare      Social History     Social History Narrative    Not on file        Assessment/Plan   Problem List Items Addressed This Visit       Generalized anxiety disorder    Major depressive disorder, recurrent - Primary       PLAN: [place] plan made for individual CBT bimonthly to address mood and anxiety and ptsd symptoms and anxiety in response to medical/pelvic conditions.    Ellie will continue with Dr. Audi Guo for psychiatry medication management.     PRACTICE EXERCISES PROVIDED: read handouts provided on anxiety and trauma. keep list of anxiety triggers/avoidance behaviors. read handout on ACT. followup in 2 weeks. practice cognitive defusion as discussed. practice 'cognitive restructuring' skill (with challenging questions worksheet, cognitive distortions worksheet as discussed).

## 2025-04-25 ENCOUNTER — APPOINTMENT (OUTPATIENT)
Dept: BEHAVIORAL HEALTH | Facility: CLINIC | Age: 35
End: 2025-04-25
Payer: COMMERCIAL

## 2025-04-25 DIAGNOSIS — F33.1 MODERATE EPISODE OF RECURRENT MAJOR DEPRESSIVE DISORDER: ICD-10-CM

## 2025-04-25 DIAGNOSIS — F41.1 GENERALIZED ANXIETY DISORDER: Primary | ICD-10-CM

## 2025-04-25 PROCEDURE — 90837 PSYTX W PT 60 MINUTES: CPT | Performed by: PSYCHOLOGIST

## 2025-04-25 NOTE — PROGRESS NOTES
START TIME: 8:05   END TIME: 9  TOTAL TIME FACE TO FACE: 55mins    Subjective   Patient ID: Ellie Garcia is a 34 y.o. female who presents for   Problem List Items Addressed This Visit       Generalized anxiety disorder - Primary    Major depressive disorder, recurrent   .    Virtual or Telephone Consent    An interactive audio and video telecommunication system which permits real time communications between the patient (at the originating site) and provider (at the distant site) was utilized to provide this telehealth service.   Verbal consent was requested and obtained from Ellie Garcia on this date, 04/25/25 for a telehealth visit and the patient's location was confirmed at the time of the visit.     Pt located at home residence. Requested sensitive note.     CONTENT OF SESSION: This was a followup appointment (cognitive behavioral therapy) between provider and patient to address symptoms of postpartum anxiety, depression, and ptsd.        SYMPTOMS: fatigue, low mood, anxiety, difficulty relaxing; worry thoughts.     Ellie was relatively euthymic at times during session- affect was appropriate to content.  She reported overall good mood; she learned yesterday that [place] got a spot in  later this year. She also spent most of the day outside yesterday and enjoyed it. She described appropriate use of cognitive restructuring and behavioral activation skills. Provider gave positive feedback about this skill use. Ellie reported some anxiety and excessive guilt in context of interpersonal relationships, especially her mother, and how this may relate to 'people pleasing' behaviors.  Provider used Socratic questioning to facilitate cognitive restructuring of potential unrealistic or unhelpful cognitions and patient was able to engage in cognitive restructuring.  Provider reviewed psychoeducation on interpersonal boundaries and limit setting.  Provider gave supportive counseling and normalized  common emotional experiences.       Objective   Social History     Substance and Sexual Activity   Alcohol Use Yes    Comment: rare      Social History     Social History Narrative    Not on file        Assessment/Plan   Problem List Items Addressed This Visit       Generalized anxiety disorder - Primary    Major depressive disorder, recurrent     PLAN: [place] plan made for individual CBT bimonthly to address mood and anxiety and ptsd symptoms and anxiety in response to medical/pelvic conditions.    Ellie will continue with Dr. Audi Guo for psychiatry medication management.     PRACTICE EXERCISES PROVIDED: read handouts provided on anxiety and trauma. keep list of anxiety triggers/avoidance behaviors. read handout on ACT. followup in 2 weeks. practice cognitive defusion as discussed. practice 'cognitive restructuring' skill (with challenging questions worksheet, cognitive distortions worksheet as discussed).

## 2025-05-15 ENCOUNTER — APPOINTMENT (OUTPATIENT)
Dept: BEHAVIORAL HEALTH | Facility: CLINIC | Age: 35
End: 2025-05-15
Payer: COMMERCIAL

## 2025-05-29 ENCOUNTER — APPOINTMENT (OUTPATIENT)
Dept: BEHAVIORAL HEALTH | Facility: CLINIC | Age: 35
End: 2025-05-29
Payer: COMMERCIAL

## 2025-05-29 DIAGNOSIS — F33.1 MODERATE EPISODE OF RECURRENT MAJOR DEPRESSIVE DISORDER: Primary | ICD-10-CM

## 2025-05-29 DIAGNOSIS — F41.1 GENERALIZED ANXIETY DISORDER: ICD-10-CM

## 2025-05-29 PROCEDURE — 90837 PSYTX W PT 60 MINUTES: CPT | Performed by: PSYCHOLOGIST

## 2025-05-29 NOTE — PROGRESS NOTES
START TIME: 8:05   END TIME: 9  TOTAL TIME FACE TO FACE: 55min    Subjective   Patient ID: Ellie Garcia is a 34 y.o. female who presents for   Problem List Items Addressed This Visit       Generalized anxiety disorder    Major depressive disorder, recurrent - Primary   .    Virtual or Telephone Consent    An interactive audio and video telecommunication system which permits real time communications between the patient (at the originating site) and provider (at the distant site) was utilized to provide this telehealth service.   Verbal consent was requested and obtained from Ellie Garcia on this date, 05/29/25 for a telehealth visit and the patient's location was confirmed at the time of the visit.     Pt located at home residence. Requested sensitive note.     CONTENT OF SESSION: This was a followup appointment (cognitive behavioral therapy) between provider and patient to address symptoms of postpartum anxiety, depression, and ptsd.        SYMPTOMS: fatigue, low mood, anxiety, difficulty relaxing; worry thoughts.     Focus of session was on Ellie's experience with premenstrual dysphoric disorder (PMDD) symptoms; she noted being in acute PMDD phase today. She discussed learned her warning signs and tracking her cycle (e.g., feeling lower motivation to engage socially or leave her house). Time was spent on problem solving strategies for modification of behavioral activation plan during different phase of menstrual cycle (e.g., limiting social schedule during premenstrual period). Provider reviewed psychoeducation on PMDD symptoms and on biopsychosocial factors contributing to PMDD symptoms. Time was spent on emotional processing of interpersonal factors. Ellie identified her cognitive and interpersonal patterns and described increased insight over time and ability to place appropriate limits and boundaries with others. Provider gave positive feedback about this skill use and encouraged continued  skill use.  Provider gave supportive counseling and normalized common emotional experiences.    Objective   Social History     Substance and Sexual Activity   Alcohol Use Yes    Comment: rare      Social History     Social History Narrative    Not on file        Assessment/Plan   Problem List Items Addressed This Visit       Generalized anxiety disorder    Major depressive disorder, recurrent - Primary     PLAN: [place] plan made for individual CBT bimonthly to address mood and anxiety and ptsd symptoms and anxiety in response to medical/pelvic conditions.    Ellie will continue with Dr. Audi Guo for psychiatry medication management.     PRACTICE EXERCISES PROVIDED: read handouts provided on anxiety and trauma. keep list of anxiety triggers/avoidance behaviors. read handout on ACT. followup in 2 weeks. practice cognitive defusion as discussed. practice 'cognitive restructuring' skill (with challenging questions worksheet, cognitive distortions worksheet as discussed).

## 2025-06-12 ENCOUNTER — APPOINTMENT (OUTPATIENT)
Dept: BEHAVIORAL HEALTH | Facility: CLINIC | Age: 35
End: 2025-06-12
Payer: COMMERCIAL

## 2025-06-12 DIAGNOSIS — F41.1 GENERALIZED ANXIETY DISORDER: ICD-10-CM

## 2025-06-12 DIAGNOSIS — F33.1 MODERATE EPISODE OF RECURRENT MAJOR DEPRESSIVE DISORDER: Primary | ICD-10-CM

## 2025-06-12 PROCEDURE — 90837 PSYTX W PT 60 MINUTES: CPT | Performed by: PSYCHOLOGIST

## 2025-06-12 NOTE — PROGRESS NOTES
"START TIME: 8:05   END TIME: 9  TOTAL TIME FACE TO FACE: 55min    Subjective   Patient ID: Ellie Garcia is a 34 y.o. female who presents for   Problem List Items Addressed This Visit       Generalized anxiety disorder    Major depressive disorder, recurrent - Primary   .    Virtual or Telephone Consent    An interactive audio and video telecommunication system which permits real time communications between the patient (at the originating site) and provider (at the distant site) was utilized to provide this telehealth service.   Verbal consent was requested and obtained from Ellie Garcia on this date, 06/12/25 for a telehealth visit and the patient's location was confirmed at the time of the visit.     Pt located at home residence. Requested sensitive note.     CONTENT OF SESSION: This was a followup appointment (cognitive behavioral therapy) between provider and patient to address symptoms of anxiety and premenstrual dysphoric disorder (PMDD)..        SYMPTOMS: fatigue, low mood, anxiety, worry thoughts. She was tearful in session when discussing impact of past distressing relationship experiences. She reported being in premenstrual week with PMDD symptom flare up.     Ellie reported some anxious distress related to relationship stressors/communication challenges with her spouse. She identified her own cognitive and behavioral patterns associated with anxiety in this interpersonal context and identified past experiences that contributed to all-or-none beliefs about relationships (e.g., \"you should tell each other everything\"). She identified a recent example where she perceives she is sharing more than [place] is, which triggers a thought of \"he doesn't like me.\". Provider used Socratic questioning to facilitate cognitive restructuring of potential unrealistic or unhelpful cognitions and patient was able to engage in cognitive restructuring. Provider also reviewed psychoeducation on interpersonal " boundaries and assertive communication strategies (e.g., DEAR MAN model from DBT skills). Time was spent on role play of assertive communication. Time was also spent discussing potential differences in privacy vs. Secrecy in relationships.  Provider gave supportive counseling and normalized common emotional experiences.    Objective   Social History     Substance and Sexual Activity   Alcohol Use Yes    Comment: rare      Social History     Social History Narrative    Not on file        Assessment/Plan   Problem List Items Addressed This Visit       Generalized anxiety disorder    Major depressive disorder, recurrent - Primary     PLAN: [place] plan made for individual CBT bimonthly to address mood and anxiety and ptsd symptoms and anxiety in response to medical/pelvic conditions.    Ellie will continue with Dr. Audi Guo for psychiatry medication management.     PRACTICE EXERCISES PROVIDED: read handouts provided on anxiety and trauma. keep list of anxiety triggers/avoidance behaviors. read handout on ACT. followup in 2 weeks. practice cognitive defusion as discussed. practice 'cognitive restructuring' skill (with challenging questions worksheet, cognitive distortions worksheet as discussed).

## 2025-06-26 ENCOUNTER — APPOINTMENT (OUTPATIENT)
Dept: BEHAVIORAL HEALTH | Facility: CLINIC | Age: 35
End: 2025-06-26
Payer: COMMERCIAL

## 2025-06-26 DIAGNOSIS — F33.1 MODERATE EPISODE OF RECURRENT MAJOR DEPRESSIVE DISORDER: Primary | ICD-10-CM

## 2025-06-26 DIAGNOSIS — F41.1 GENERALIZED ANXIETY DISORDER: ICD-10-CM

## 2025-06-26 PROCEDURE — 90837 PSYTX W PT 60 MINUTES: CPT | Performed by: PSYCHOLOGIST

## 2025-06-26 NOTE — PROGRESS NOTES
START TIME: 8:05   END TIME: 9  TOTAL TIME FACE TO FACE: 55min    Subjective   Patient ID: Ellie Garcia is a 34 y.o. female who presents for   Problem List Items Addressed This Visit       Generalized anxiety disorder    Major depressive disorder, recurrent - Primary   .    Virtual or Telephone Consent    An interactive audio and video telecommunication system which permits real time communications between the patient (at the originating site) and provider (at the distant site) was utilized to provide this telehealth service.   Verbal consent was requested and obtained from Ellie Garcia on this date, 06/26/25 for a telehealth visit and the patient's location was confirmed at the time of the visit.     Pt located at home residence. Requested sensitive note.     CONTENT OF SESSION: This was a followup appointment (cognitive behavioral therapy) between provider and patient to address symptoms of anxiety and premenstrual dysphoric disorder (PMDD)..        SYMPTOMS: fatigue, low mood, anxiety, worry thoughts. She was tearful in session when discussing impact of past distressing relationship experiences. She reported being currently in PMDD symptom flare up.     Ellie reported some anxious distress related to relationship stressors and experiences that triggered some excessive guilt and anxiety. For example, she accidentally scheduled her daughter's birthday party on a day her parents could not attend. Provider used Socratic questioning to facilitate cognitive restructuring of potential unrealistic or unhelpful cognitions and patient was able to engage in cognitive restructuring. Ellie also discussed her use of appropriate in vivo exposure skills and communication skills as it relates to social anxiety.  She is also noticing increased insight into her PMDD symptoms and able to ask for more support from her . Provider gave positive feedback about this skill use. Provider reviewed psychoeducation on  common PMDD symptoms and impact on social functioning. Provider gave supportive counseling and normalized common emotional experiences.    Objective   Social History     Substance and Sexual Activity   Alcohol Use Yes    Comment: rare      Social History     Social History Narrative    Not on file        Assessment/Plan   Problem List Items Addressed This Visit       Generalized anxiety disorder    Major depressive disorder, recurrent - Primary     PLAN: [place] plan made for individual CBT bimonthly to address mood and anxiety and ptsd symptoms and anxiety in response to medical/pelvic conditions.    Ellie will continue with Dr. Audi Guo for psychiatry medication management.     PRACTICE EXERCISES PROVIDED: read handouts provided on anxiety and trauma. keep list of anxiety triggers/avoidance behaviors. read handout on ACT. followup in 2 weeks. practice cognitive defusion as discussed. practice 'cognitive restructuring' skill (with challenging questions worksheet, cognitive distortions worksheet as discussed).

## 2025-07-11 ENCOUNTER — APPOINTMENT (OUTPATIENT)
Dept: BEHAVIORAL HEALTH | Facility: CLINIC | Age: 35
End: 2025-07-11
Payer: COMMERCIAL

## 2025-07-11 DIAGNOSIS — F33.1 MODERATE EPISODE OF RECURRENT MAJOR DEPRESSIVE DISORDER: ICD-10-CM

## 2025-07-11 DIAGNOSIS — F32.81 PREMENSTRUAL DYSPHORIA: ICD-10-CM

## 2025-07-11 PROCEDURE — 99214 OFFICE O/P EST MOD 30 MIN: CPT | Performed by: STUDENT IN AN ORGANIZED HEALTH CARE EDUCATION/TRAINING PROGRAM

## 2025-07-11 NOTE — PROGRESS NOTES
Subjective    All Individuals Present: Patient and Provider (Encounter Provider)     ID: Ellie Garcia is a 34 y.o. female presenting for assessment.     Interval History/HPI/PFSH:    Has been reading a lot more this summer.    Last cycle came 1 week early.    Some sleep disruption this week with having the week off.    Has not had any adverse effects from continuous dosing of fluoxetine. Feels like she has a better attitude overall/in-general, easier to control her affect.    Rarely using lorazepam.    No SI, HI, AVH.      PMDD screen:     Menses:  First period: 13 yo  Cycle: (x) regular () irregular   Cycle Length: 30 days, 4-7 days of bleeding; gets mood symptoms 10-14 days prior  Flow: heavy  Cramping/pain: painful  D/t pain and heavy flow, was placed on combo BC pill, helped with physical symptoms; first combo pill seemed to help mentally as well.     Menstrual Related Symptoms: () prospective report (x) retrospective report      Affective  ( x ) marked affective lability  ( x ) marked irritability or anger; increased interpersonal conflicts  ( x ) marked depressed mood, feelings of hopeless or self-deprecating thoughts  ( x ) maked tension, anxiety, feeling keyed up or on edge      Behavioral/Physical   (  ) decreased interest in usual activities  ( x ) difficulties with concentration  ( x ) lethargy, fatigue, lack of energy   (  ) changes in appetite  ( x ) hypersomnia or insomnia  ( x ) feeling out of control or overwhelmed  ( x ) physical symptoms: breast tenderness/swelling, bloating, weight gain, joint/muscle pain, acne       Medication side effects: None Reported     Review of Systems  Constitutional: Positive for fatigue, Negative for fevers  Psychiatric: Positive for anxiety, depression, fatigue, irritability, and mood swings, Negative for learning difficulty and sleep disturbance  Neurological: Negative   Other: persistent cough since 10/2024    Current Outpatient Medications on File Prior to  Visit   Medication Sig Dispense Refill    buPROPion XL (Wellbutrin XL) 300 mg 24 hr tablet Take 1 tablet (300 mg) by mouth once daily. Do not crush, chew, or split. 30 tablet 11    colestipol (Colestid) 1 gram tablet Take by mouth. Take 1 pill every other day, if not improvement after two weeks, take one daily, if no improvement after that, thke 2 pills daily      diazePAM (Valium) 2 mg tablet 1 tablet (2 mg). Place 1 tablet in vagina as needed before intercourse or pelvic floor therapy up to every 8 hours      FLUoxetine (PROzac) 10 mg capsule Take 1 capsule (10 mg) by mouth once daily. Dose increase to daily 90 capsule 3    levonorgestrel (Mirena) 21 mcg/24 hours (8 yrs) 52 mg IUD by intrauterine route. Inserted November 11,2021      LORazepam (Ativan) 0.5 mg tablet Take 1 tablet (0.5 mg) by mouth once daily as needed for anxiety. 10 tablet 2    psyllium (Metamucil, with sugar,) 3.4 gram packet Take by mouth. use 1 packet daily, if still having loose stool after 1 week, go up to 2 packets daily       No current facility-administered medications on file prior to visit.        Past Psychiatric Hx:  Dx: started seeing mental health providers in teenage years, ~14-15 yo starting meds; has been treated for depression and OCD (constant scrubbing/bathing), dx with bipolar II  -No hospitalizations  -SA at 14 yo (hanging), a few more in high school (hanging, potential attempts at overdose); also SIB by cutting in HS  Rx Trials: citalopram (blunting), trazodone (stopped with pregnancy, v. sedating), sertraline (weight gain), escitalopram, carbamazepine (blunting), lurasidone (weight gain)  Has never been on fluoxetine, lamotrigine    Patient Active Problem List   Diagnosis    Abdominal pain of multiple sites    Chronic sinusitis    Foot pain    Irritable bowel syndrome    Stool incontinence    Stress incontinence of urine    Abdominal pain, bilateral lower quadrant    Deep dyspareunia in female    Generalized anxiety  "disorder    History of food allergy    Major depressive disorder, recurrent    Muscle spasm    Pelvic floor dysfunction in female    Pelvic pain in female    Vaginal discharge    Well woman exam with routine gynecological exam    Uterovaginal prolapse    IUD (intrauterine device) in place     Past Surgical History:   Procedure Laterality Date    OTHER SURGICAL HISTORY  07/30/2014    ENT Surgical Result - Throat    OTHER SURGICAL HISTORY  05/06/2020    Dilation and curettage    VAGINOPLASTY       Allergies   Allergen Reactions    Codeine Hives and Itching     Family History   Problem Relation Name Age of Onset    Meniere's disease Mother      Other (CVA) Father      Diabetes type II Father's Sister      Diabetes type II Paternal Grandfather      Crohn's disease Other          Maternal Aunt, Cousin     -paternal aunt with some sort of psychosis/?cluster A?  -another aunt maybe with bipolar  -cousin with addiction    Objective   There were no vitals taken for this visit.  Wt Readings from Last 4 Encounters:   11/07/24 59 kg (130 lb)   06/27/24 62.6 kg (138 lb)   08/10/23 65.3 kg (144 lb)   07/06/23 63.5 kg (140 lb)       Mental Status Exam  General Appearance: Well groomed, appropriate eye contact.  Attitude/Behavior: Cooperative, conversant, engaged, and with good eye contact.  Motor: No psychomotor agitation or retardation, no tremor or other abnormal movements.  Speech: Normal rate, volume, prosody  Gait/Station: Within normal limits  Mood: \"good\"  Affect: Euthymic, full-range and Congruent with mood and topic of conversation  Thought Process: Linear, goal directed  Thought Associations: No loosening of associations  Thought Content: normal  Sensorium: Alert and oriented to person, place, time and situation  Insight: Fair, as evidenced by awareness of symptom patterns  Judgment: Fair  Cognition: Cognitively intact to conversational testing with respect to attention, orientation, fund of knowledge, recent and remote " memory, and language.  Testing: N/A.    Laboratory/Imaging/Diagnostic Tests       Assessment/Plan   Overall Formulation and Differential Diagnosis:  Ellie Garcia is a 34 y.o. female who meets criteria for provisional PMDD based on retrospective data.    Interval Assessment:  Progressive worsening of premenstrual affective symptoms (including daily passive SI), physical symptoms, and functional impairment. Currently has hormonal IUD, will try premenstrual SRI dosing for symptoms. Continued baseline depression as well and will increase bupropion. Able to contract for safety.    *7/16/25: Stable, no need for changes.    -continue bupropion 300 mg PO daily  -continue  fluoxetine to 10 mg PO daily continuous dosing (instead of catamenial)  -trial lorazepam 0.5 mg PO daily PRN panic  -continue prospective menstrual tracking  -RTC 12 weeks    Risk Assessment:  Imminent Risk of Suicide or Serious Self-Injury: Low Risk -- Risk factors include: History of self harm , History of suicide attempt , and Medical illness comorbidity  Protective factors include:Future-oriented talk , Willingness to seek help and support , Skills in problem solving, conflict resolution, and nonviolent handling of disputes, Cultural and Jehovah's witness beliefs that discourage suicide and support self-preservation , Access to a variety of clinical interventions , Receiving and engaged in care for mental, physical, and substance use disorders , History of adhering to treatment recommendations and/or prescribed medication regimen , Support through ongoing medical and mental healthcare relationships , Interpersonal relationships and supports, e.g., family, friends, peers, community , and Restricted access to firearms or other lethal means of suicide   Imminent Risk of Violence or Homicide: Low Risk - Risk factors include: No significant risk factors identified on screening. Protective factors include: Lack of known history of harm to others , Lack of  known history of violent ideation , and Lack of known access to firearms   Treatment Plan:  There are no recently modified care plans to display for this patient.      Attestation Statements   Number of Minutes Spent Performing Evaluation & Management (E&M): 30 and Topics (in addition those noted above): Diagnostic impressions, Importance of adherence to treatment , Instructions for treatment , Patient education , Risks and benefits of treatments , Risk factor reduction , and Side effects of medications

## 2025-07-24 ENCOUNTER — APPOINTMENT (OUTPATIENT)
Dept: OBSTETRICS AND GYNECOLOGY | Facility: CLINIC | Age: 35
End: 2025-07-24
Payer: COMMERCIAL

## 2025-07-28 RX ORDER — LORAZEPAM 0.5 MG/1
0.5 TABLET ORAL DAILY PRN
Qty: 10 TABLET | Refills: 2 | Status: SHIPPED | OUTPATIENT
Start: 2025-07-28 | End: 2025-08-27

## 2025-07-28 RX ORDER — BUPROPION HYDROCHLORIDE 300 MG/1
300 TABLET ORAL DAILY
Qty: 90 TABLET | Refills: 3 | Status: SHIPPED | OUTPATIENT
Start: 2025-07-28 | End: 2026-07-28

## 2025-08-08 ENCOUNTER — TELEMEDICINE (OUTPATIENT)
Dept: BEHAVIORAL HEALTH | Facility: CLINIC | Age: 35
End: 2025-08-08
Payer: COMMERCIAL

## 2025-08-08 DIAGNOSIS — F41.1 GENERALIZED ANXIETY DISORDER: Primary | ICD-10-CM

## 2025-08-08 DIAGNOSIS — F33.1 MODERATE EPISODE OF RECURRENT MAJOR DEPRESSIVE DISORDER: ICD-10-CM

## 2025-08-08 PROCEDURE — 90837 PSYTX W PT 60 MINUTES: CPT | Performed by: PSYCHOLOGIST

## 2025-08-08 NOTE — PROGRESS NOTES
START TIME: 9:05   END TIME: 10  TOTAL TIME FACE TO FACE: 55min    Subjective   Patient ID: Ellie Garcia is a 35 y.o. female who presents for   Problem List Items Addressed This Visit       Generalized anxiety disorder - Primary    Major depressive disorder, recurrent   .    Virtual or Telephone Consent    An interactive audio and video telecommunication system which permits real time communications between the patient (at the originating site) and provider (at the distant site) was utilized to provide this telehealth service.   Verbal consent was requested and obtained from Ellie Garcia on this date, 08/08/25 for a telehealth visit and the patient's location was confirmed at the time of the visit.     Pt located at home residence. Requested sensitive note.     CONTENT OF SESSION: This was a followup appointment (cognitive behavioral therapy) between provider and patient to address symptoms of anxiety and premenstrual dysphoric disorder (PMDD)..        SYMPTOMS: fatigue, low mood, anxiety, worry thoughts, restlessness    Ellie reported increased anxiety in context of psychosocial stressors and adjustments. Baltazar started a new job this week and is no longer working from home. Rose Marie also starts  in a few weeks. As a result, Ellie has had to make some adjustments to her routine and her work schedule. She acknowledged some anxious worry thoughts and identified catastrophic thinking patterns in response to stressors and unknowns. For example, she worries that Baltazar is upset with her and not telling her. Provider used Socratic questioning to facilitate cognitive restructuring of potential unrealistic or unhelpful cognitions and patient was able to engage in cognitive restructuring. provider gave psychoeducation on healthy communication patterns and boundaries in relationships. Ellie did discuss the positive impact of having a close relationship with another mom who has a similar-aged daughter.  Provider gave positive feedback about Ellie engaging in positive social supports. Provider gave supportive counseling and normalized common emotional experiences.    Objective   Social History     Substance and Sexual Activity   Alcohol Use Yes    Comment: rare      Social History     Social History Narrative    Not on file        Assessment/Plan   Problem List Items Addressed This Visit       Generalized anxiety disorder - Primary    Major depressive disorder, recurrent       PLAN: [place] plan made for individual CBT bimonthly to address mood and anxiety and ptsd symptoms and anxiety in response to medical/pelvic conditions.    Ellie will continue with Dr. Audi Guo for psychiatry medication management.     PRACTICE EXERCISES PROVIDED: read handouts provided on anxiety and trauma. keep list of anxiety triggers/avoidance behaviors. read handout on ACT. followup in 2 weeks. practice cognitive defusion as discussed. practice 'cognitive restructuring' skill (with challenging questions worksheet, cognitive distortions worksheet as discussed).

## 2025-08-21 ENCOUNTER — APPOINTMENT (OUTPATIENT)
Dept: OBSTETRICS AND GYNECOLOGY | Facility: CLINIC | Age: 35
End: 2025-08-21
Payer: COMMERCIAL

## 2025-08-21 VITALS
SYSTOLIC BLOOD PRESSURE: 108 MMHG | WEIGHT: 126.6 LBS | BODY MASS INDEX: 21.61 KG/M2 | HEIGHT: 64 IN | DIASTOLIC BLOOD PRESSURE: 66 MMHG

## 2025-08-21 DIAGNOSIS — Z97.5 IUD (INTRAUTERINE DEVICE) IN PLACE: ICD-10-CM

## 2025-08-21 DIAGNOSIS — Z01.419 WELL WOMAN EXAM WITH ROUTINE GYNECOLOGICAL EXAM: Primary | ICD-10-CM

## 2025-08-21 DIAGNOSIS — N81.4 UTEROVAGINAL PROLAPSE: ICD-10-CM

## 2025-08-21 PROCEDURE — 3008F BODY MASS INDEX DOCD: CPT | Performed by: OBSTETRICS & GYNECOLOGY

## 2025-08-21 PROCEDURE — 1036F TOBACCO NON-USER: CPT | Performed by: OBSTETRICS & GYNECOLOGY

## 2025-08-21 PROCEDURE — 99395 PREV VISIT EST AGE 18-39: CPT | Performed by: OBSTETRICS & GYNECOLOGY

## 2025-08-21 RX ORDER — CLONAZEPAM 1 MG/1
TABLET ORAL 2 TIMES DAILY
COMMUNITY
End: 2025-08-21 | Stop reason: WASHOUT

## 2025-08-21 ASSESSMENT — ENCOUNTER SYMPTOMS
AGITATION: 0
APNEA: 0
ACTIVITY CHANGE: 0
JOINT SWELLING: 0
DIARRHEA: 0
HEMATURIA: 0
CONSTIPATION: 0
COUGH: 0
DIZZINESS: 0

## 2025-08-28 ENCOUNTER — APPOINTMENT (OUTPATIENT)
Dept: BEHAVIORAL HEALTH | Facility: CLINIC | Age: 35
End: 2025-08-28
Payer: COMMERCIAL

## 2025-08-28 DIAGNOSIS — F33.1 MODERATE EPISODE OF RECURRENT MAJOR DEPRESSIVE DISORDER: ICD-10-CM

## 2025-08-28 DIAGNOSIS — F41.1 GENERALIZED ANXIETY DISORDER: Primary | ICD-10-CM

## 2025-08-28 PROCEDURE — 90837 PSYTX W PT 60 MINUTES: CPT | Performed by: PSYCHOLOGIST

## 2025-10-10 ENCOUNTER — APPOINTMENT (OUTPATIENT)
Dept: BEHAVIORAL HEALTH | Facility: CLINIC | Age: 35
End: 2025-10-10
Payer: COMMERCIAL

## 2025-10-17 ENCOUNTER — APPOINTMENT (OUTPATIENT)
Dept: BEHAVIORAL HEALTH | Facility: CLINIC | Age: 35
End: 2025-10-17
Payer: COMMERCIAL

## 2025-10-22 ENCOUNTER — APPOINTMENT (OUTPATIENT)
Dept: BEHAVIORAL HEALTH | Facility: CLINIC | Age: 35
End: 2025-10-22
Payer: COMMERCIAL

## 2025-10-24 ENCOUNTER — APPOINTMENT (OUTPATIENT)
Dept: BEHAVIORAL HEALTH | Facility: CLINIC | Age: 35
End: 2025-10-24
Payer: COMMERCIAL

## 2025-10-31 ENCOUNTER — APPOINTMENT (OUTPATIENT)
Dept: BEHAVIORAL HEALTH | Facility: CLINIC | Age: 35
End: 2025-10-31
Payer: COMMERCIAL

## 2025-11-06 ENCOUNTER — APPOINTMENT (OUTPATIENT)
Dept: BEHAVIORAL HEALTH | Facility: CLINIC | Age: 35
End: 2025-11-06
Payer: COMMERCIAL

## 2025-11-07 ENCOUNTER — APPOINTMENT (OUTPATIENT)
Dept: PRIMARY CARE | Facility: CLINIC | Age: 35
End: 2025-11-07
Payer: COMMERCIAL

## 2025-11-20 ENCOUNTER — APPOINTMENT (OUTPATIENT)
Dept: BEHAVIORAL HEALTH | Facility: CLINIC | Age: 35
End: 2025-11-20
Payer: COMMERCIAL

## 2025-12-12 ENCOUNTER — APPOINTMENT (OUTPATIENT)
Dept: BEHAVIORAL HEALTH | Facility: CLINIC | Age: 35
End: 2025-12-12
Payer: COMMERCIAL

## 2026-01-02 ENCOUNTER — APPOINTMENT (OUTPATIENT)
Dept: BEHAVIORAL HEALTH | Facility: CLINIC | Age: 36
End: 2026-01-02
Payer: COMMERCIAL

## 2026-01-16 ENCOUNTER — APPOINTMENT (OUTPATIENT)
Dept: BEHAVIORAL HEALTH | Facility: CLINIC | Age: 36
End: 2026-01-16
Payer: COMMERCIAL

## 2026-01-30 ENCOUNTER — APPOINTMENT (OUTPATIENT)
Dept: BEHAVIORAL HEALTH | Facility: CLINIC | Age: 36
End: 2026-01-30
Payer: COMMERCIAL

## 2026-09-24 ENCOUNTER — APPOINTMENT (OUTPATIENT)
Dept: OBSTETRICS AND GYNECOLOGY | Facility: CLINIC | Age: 36
End: 2026-09-24
Payer: COMMERCIAL